# Patient Record
Sex: FEMALE | Race: WHITE | Employment: UNEMPLOYED | URBAN - NONMETROPOLITAN AREA
[De-identification: names, ages, dates, MRNs, and addresses within clinical notes are randomized per-mention and may not be internally consistent; named-entity substitution may affect disease eponyms.]

---

## 2018-02-19 ENCOUNTER — OFFICE VISIT (OUTPATIENT)
Dept: UROLOGY | Age: 23
End: 2018-02-19
Payer: COMMERCIAL

## 2018-02-19 VITALS
HEART RATE: 86 BPM | HEIGHT: 61 IN | WEIGHT: 215 LBS | TEMPERATURE: 98.6 F | DIASTOLIC BLOOD PRESSURE: 75 MMHG | BODY MASS INDEX: 40.59 KG/M2 | SYSTOLIC BLOOD PRESSURE: 133 MMHG

## 2018-02-19 DIAGNOSIS — R10.30 LOWER ABDOMINAL PAIN: ICD-10-CM

## 2018-02-19 DIAGNOSIS — N32.89 BLADDER SPASMS: Primary | ICD-10-CM

## 2018-02-19 LAB
APPEARANCE FLUID: CLEAR
BILIRUBIN, POC: 0
BLOOD URINE, POC: NORMAL
CLARITY, POC: CLEAR
COLOR, POC: YELLOW
GLUCOSE URINE, POC: NORMAL
KETONES, POC: NORMAL
LEUKOCYTE EST, POC: NORMAL
NITRITE, POC: NORMAL
PH, POC: 6
PROTEIN, POC: NORMAL
SPECIFIC GRAVITY, POC: 1.03
UROBILINOGEN, POC: 0.2

## 2018-02-19 PROCEDURE — 51798 US URINE CAPACITY MEASURE: CPT | Performed by: PHYSICIAN ASSISTANT

## 2018-02-19 PROCEDURE — 81003 URINALYSIS AUTO W/O SCOPE: CPT | Performed by: PHYSICIAN ASSISTANT

## 2018-02-19 PROCEDURE — 99202 OFFICE O/P NEW SF 15 MIN: CPT | Performed by: PHYSICIAN ASSISTANT

## 2018-02-19 RX ORDER — LEVOMEFOLATE CALCIUM 15 MG
TABLET ORAL
COMMUNITY

## 2018-02-19 RX ORDER — ONDANSETRON 4 MG/1
4 TABLET, FILM COATED ORAL EVERY 8 HOURS PRN
COMMUNITY

## 2018-02-19 RX ORDER — DOXEPIN HYDROCHLORIDE 10 MG/1
10 CAPSULE ORAL NIGHTLY
COMMUNITY

## 2018-02-19 RX ORDER — VENLAFAXINE 50 MG/1
75 TABLET ORAL 3 TIMES DAILY
COMMUNITY

## 2018-02-19 ASSESSMENT — ENCOUNTER SYMPTOMS
HEARTBURN: 0
DOUBLE VISION: 0
SORE THROAT: 0
SHORTNESS OF BREATH: 0
BLURRED VISION: 0
NAUSEA: 0
WHEEZING: 0

## 2018-02-19 NOTE — PROGRESS NOTES
No cranial nerve deficit. She exhibits normal muscle tone. Coordination normal.   Skin: She is not diaphoretic. No pallor. DATA:  U/A:    Lab Results   Component Value Date    NITRITE neg 02/19/2018    COLORU yellow 02/19/2018    PROTEINU neg 02/19/2018    PHUR 6.0 02/19/2018    CLARITYU clear 02/19/2018    SPECGRAV 1.030 02/19/2018    LEUKOCYTESUR neg 02/19/2018    BILIRUBINUR 0 02/19/2018    BLOODU neg 02/19/2018    GLUCOSEU neg 02/19/2018                 1. Bladder spasms  Patient was several months of pain in her lower abdomen or what she describes as bladder spasms after she is done voiding. She has no other irritative voiding symptoms she has been treated for urinary tract infections with the same symptoms however symptoms have persisted with or without antibiotics. I do not see a recent or any urine cultures scanned in and our records. Her urine today was clear. - POCT Urinalysis no Micro  - [place] MEASUREMENT,POST-VOID RESIDUAL VOLUME BY US,NON-IMAGING    2. Lower abdominal pain  Due to the lower abdominal discomfort and voiding symptoms will have patient get a CT scan without contrast to better evaluate.  - CT ABDOMEN PELVIS WO CONTRAST Additional Contrast? None; Future      Orders Placed This Encounter   Procedures    CT ABDOMEN PELVIS WO CONTRAST Additional Contrast? None     Standing Status:   Future     Standing Expiration Date:   2/19/2019     Order Specific Question:   Additional Contrast?     Answer:   None     Order Specific Question:   Reason for exam:     Answer:   lower abdominal pain and recurent utis.  POCT Urinalysis no Micro    [place] MEASUREMENT,POST-VOID RESIDUAL VOLUME BY US,NON-IMAGING     60 ml bladder scan     No orders of the defined types were placed in this encounter.     Plan:  Patient will return in 1 week with CT scan of the abdomen pelvis without contrast.

## 2018-02-27 ENCOUNTER — HOSPITAL ENCOUNTER (OUTPATIENT)
Dept: CT IMAGING | Age: 23
Discharge: HOME | End: 2018-02-27
Payer: COMMERCIAL

## 2018-02-27 ENCOUNTER — OFFICE VISIT (OUTPATIENT)
Dept: UROLOGY | Age: 23
End: 2018-02-27
Payer: COMMERCIAL

## 2018-02-27 VITALS
SYSTOLIC BLOOD PRESSURE: 132 MMHG | BODY MASS INDEX: 40.4 KG/M2 | DIASTOLIC BLOOD PRESSURE: 90 MMHG | HEIGHT: 61 IN | WEIGHT: 214 LBS | HEART RATE: 104 BPM | TEMPERATURE: 97.2 F

## 2018-02-27 DIAGNOSIS — R10.30 LOWER ABDOMINAL PAIN: ICD-10-CM

## 2018-02-27 DIAGNOSIS — N32.81 URGENCY-FREQUENCY SYNDROME: ICD-10-CM

## 2018-02-27 DIAGNOSIS — N32.89 BLADDER SPASMS: Primary | ICD-10-CM

## 2018-02-27 LAB
APPEARANCE FLUID: CLEAR
BILIRUBIN, POC: NORMAL
BLOOD URINE, POC: NORMAL
CLARITY, POC: NORMAL
COLOR, POC: NORMAL
GLUCOSE URINE, POC: NORMAL
KETONES, POC: NORMAL
LEUKOCYTE EST, POC: NORMAL
NITRITE, POC: NORMAL
PH, POC: 5.5
PROTEIN, POC: NORMAL
SPECIFIC GRAVITY, POC: 1.03
UROBILINOGEN, POC: 0.2

## 2018-02-27 PROCEDURE — 74176 CT ABD & PELVIS W/O CONTRAST: CPT

## 2018-02-27 PROCEDURE — 99213 OFFICE O/P EST LOW 20 MIN: CPT | Performed by: PHYSICIAN ASSISTANT

## 2018-02-27 RX ORDER — HYDROXYZINE 50 MG/1
50 TABLET, FILM COATED ORAL 3 TIMES DAILY PRN
COMMUNITY

## 2018-02-27 ASSESSMENT — ENCOUNTER SYMPTOMS
EYE DISCHARGE: 0
NAUSEA: 0
WHEEZING: 0
HEARTBURN: 0
SHORTNESS OF BREATH: 0
EYE REDNESS: 0

## 2018-04-30 ENCOUNTER — HOSPITAL ENCOUNTER (OUTPATIENT)
Facility: HOSPITAL | Age: 23
Setting detail: OBSERVATION
End: 2018-05-01
Attending: FAMILY MEDICINE | Admitting: FAMILY MEDICINE

## 2018-04-30 DIAGNOSIS — T14.91XA SUICIDAL BEHAVIOR WITH ATTEMPTED SELF-INJURY (HCC): ICD-10-CM

## 2018-04-30 DIAGNOSIS — T43.012A: Primary | ICD-10-CM

## 2018-04-30 PROBLEM — F31.9 BIPOLAR 1 DISORDER (HCC): Status: ACTIVE | Noted: 2018-04-30

## 2018-04-30 LAB
ALBUMIN SERPL-MCNC: 4.4 G/DL (ref 3.4–4.8)
ALBUMIN/GLOB SERPL: 1.4 G/DL (ref 1.1–1.8)
ALP SERPL-CCNC: 119 U/L (ref 38–126)
ALT SERPL W P-5'-P-CCNC: 41 U/L (ref 9–52)
AMPHET+METHAMPHET UR QL: NEGATIVE
ANION GAP SERPL CALCULATED.3IONS-SCNC: 14 MMOL/L (ref 5–15)
APAP SERPL-MCNC: <10 MCG/ML (ref 10–30)
AST SERPL-CCNC: 28 U/L (ref 14–36)
BARBITURATES UR QL SCN: NEGATIVE
BASOPHILS # BLD AUTO: 0.01 10*3/MM3 (ref 0–0.2)
BASOPHILS NFR BLD AUTO: 0.1 % (ref 0–2)
BENZODIAZ UR QL SCN: NEGATIVE
BILIRUB SERPL-MCNC: 0.3 MG/DL (ref 0.2–1.3)
BUN BLD-MCNC: 10 MG/DL (ref 7–21)
BUN/CREAT SERPL: 12.8 (ref 7–25)
CALCIUM SPEC-SCNC: 9.3 MG/DL (ref 8.4–10.2)
CANNABINOIDS SERPL QL: POSITIVE
CHLORIDE SERPL-SCNC: 104 MMOL/L (ref 95–110)
CO2 SERPL-SCNC: 21 MMOL/L (ref 22–31)
COCAINE UR QL: NEGATIVE
CREAT BLD-MCNC: 0.78 MG/DL (ref 0.5–1)
DEPRECATED RDW RBC AUTO: 38.2 FL (ref 36.4–46.3)
EOSINOPHIL # BLD AUTO: 0.01 10*3/MM3 (ref 0–0.7)
EOSINOPHIL NFR BLD AUTO: 0.1 % (ref 0–7)
ERYTHROCYTE [DISTWIDTH] IN BLOOD BY AUTOMATED COUNT: 13 % (ref 11.5–14.5)
ETHANOL BLD-MCNC: <10 MG/DL (ref 0–10)
ETHANOL UR QL: <0.01 %
GFR SERPL CREATININE-BSD FRML MDRD: 92 ML/MIN/1.73 (ref 71–165)
GLOBULIN UR ELPH-MCNC: 3.2 GM/DL (ref 2.3–3.5)
GLUCOSE BLD-MCNC: 89 MG/DL (ref 60–100)
GLUCOSE BLDC GLUCOMTR-MCNC: 71 MG/DL (ref 70–130)
HCT VFR BLD AUTO: 40.9 % (ref 35–45)
HGB BLD-MCNC: 13.8 G/DL (ref 12–15.5)
HOLD SPECIMEN: NORMAL
IMM GRANULOCYTES # BLD: 0.02 10*3/MM3 (ref 0–0.02)
IMM GRANULOCYTES NFR BLD: 0.2 % (ref 0–0.5)
LYMPHOCYTES # BLD AUTO: 1.51 10*3/MM3 (ref 0.6–4.2)
LYMPHOCYTES NFR BLD AUTO: 17.1 % (ref 10–50)
MCH RBC QN AUTO: 27.1 PG (ref 26.5–34)
MCHC RBC AUTO-ENTMCNC: 33.7 G/DL (ref 31.4–36)
MCV RBC AUTO: 80.4 FL (ref 80–98)
METHADONE UR QL SCN: NEGATIVE
MONOCYTES # BLD AUTO: 0.35 10*3/MM3 (ref 0–0.9)
MONOCYTES NFR BLD AUTO: 4 % (ref 0–12)
NEUTROPHILS # BLD AUTO: 6.95 10*3/MM3 (ref 2–8.6)
NEUTROPHILS NFR BLD AUTO: 78.5 % (ref 37–80)
OPIATES UR QL: NEGATIVE
OXYCODONE UR QL SCN: NEGATIVE
PLATELET # BLD AUTO: 257 10*3/MM3 (ref 150–450)
PMV BLD AUTO: 10.6 FL (ref 8–12)
POTASSIUM BLD-SCNC: 3.7 MMOL/L (ref 3.5–5.1)
PROT SERPL-MCNC: 7.6 G/DL (ref 6.3–8.6)
RBC # BLD AUTO: 5.09 10*6/MM3 (ref 3.77–5.16)
SALICYLATES SERPL-MCNC: <1 MG/DL (ref 10–20)
SODIUM BLD-SCNC: 139 MMOL/L (ref 137–145)
WBC NRBC COR # BLD: 8.85 10*3/MM3 (ref 3.2–9.8)
WHOLE BLOOD HOLD SPECIMEN: NORMAL
WHOLE BLOOD HOLD SPECIMEN: NORMAL

## 2018-04-30 PROCEDURE — 93010 ELECTROCARDIOGRAM REPORT: CPT | Performed by: INTERNAL MEDICINE

## 2018-04-30 PROCEDURE — G0378 HOSPITAL OBSERVATION PER HR: HCPCS

## 2018-04-30 PROCEDURE — 80307 DRUG TEST PRSMV CHEM ANLYZR: CPT | Performed by: STUDENT IN AN ORGANIZED HEALTH CARE EDUCATION/TRAINING PROGRAM

## 2018-04-30 PROCEDURE — 82962 GLUCOSE BLOOD TEST: CPT

## 2018-04-30 PROCEDURE — 80053 COMPREHEN METABOLIC PANEL: CPT | Performed by: STUDENT IN AN ORGANIZED HEALTH CARE EDUCATION/TRAINING PROGRAM

## 2018-04-30 PROCEDURE — 84443 ASSAY THYROID STIM HORMONE: CPT | Performed by: FAMILY MEDICINE

## 2018-04-30 PROCEDURE — 85025 COMPLETE CBC W/AUTO DIFF WBC: CPT | Performed by: STUDENT IN AN ORGANIZED HEALTH CARE EDUCATION/TRAINING PROGRAM

## 2018-04-30 PROCEDURE — 84439 ASSAY OF FREE THYROXINE: CPT | Performed by: FAMILY MEDICINE

## 2018-04-30 PROCEDURE — 36415 COLL VENOUS BLD VENIPUNCTURE: CPT

## 2018-04-30 PROCEDURE — 93005 ELECTROCARDIOGRAM TRACING: CPT | Performed by: FAMILY MEDICINE

## 2018-04-30 PROCEDURE — 99284 EMERGENCY DEPT VISIT MOD MDM: CPT

## 2018-04-30 RX ORDER — SODIUM CHLORIDE 0.9 % (FLUSH) 0.9 %
1-10 SYRINGE (ML) INJECTION AS NEEDED
Status: DISCONTINUED | OUTPATIENT
Start: 2018-04-30 | End: 2018-05-01 | Stop reason: HOSPADM

## 2018-04-30 RX ORDER — DICYCLOMINE HYDROCHLORIDE 10 MG/1
10 CAPSULE ORAL EVERY 8 HOURS SCHEDULED
COMMUNITY
End: 2018-04-30

## 2018-04-30 RX ORDER — HYDROXYZINE PAMOATE 25 MG/1
50 CAPSULE ORAL 3 TIMES DAILY PRN
Status: DISCONTINUED | OUTPATIENT
Start: 2018-04-30 | End: 2018-05-01 | Stop reason: HOSPADM

## 2018-04-30 RX ORDER — HYDROXYZINE PAMOATE 50 MG/1
0.5 CAPSULE ORAL 3 TIMES DAILY PRN
COMMUNITY
End: 2018-05-07 | Stop reason: HOSPADM

## 2018-04-30 RX ORDER — SODIUM CHLORIDE 0.9 % (FLUSH) 0.9 %
10 SYRINGE (ML) INJECTION AS NEEDED
Status: DISCONTINUED | OUTPATIENT
Start: 2018-04-30 | End: 2018-05-01 | Stop reason: HOSPADM

## 2018-04-30 RX ORDER — ARIPIPRAZOLE 10 MG/1
10 TABLET ORAL DAILY
COMMUNITY
End: 2018-04-30

## 2018-04-30 RX ORDER — OXCARBAZEPINE 150 MG/1
150 TABLET, FILM COATED ORAL 2 TIMES DAILY
Status: DISCONTINUED | OUTPATIENT
Start: 2018-04-30 | End: 2018-05-01 | Stop reason: HOSPADM

## 2018-04-30 RX ORDER — ACETAMINOPHEN 325 MG/1
650 TABLET ORAL EVERY 4 HOURS PRN
Status: DISCONTINUED | OUTPATIENT
Start: 2018-04-30 | End: 2018-05-01 | Stop reason: HOSPADM

## 2018-04-30 RX ORDER — VENLAFAXINE HYDROCHLORIDE 75 MG/1
75 CAPSULE, EXTENDED RELEASE ORAL DAILY
Status: DISCONTINUED | OUTPATIENT
Start: 2018-05-01 | End: 2018-04-30

## 2018-04-30 RX ORDER — VENLAFAXINE HYDROCHLORIDE 75 MG/1
75 CAPSULE, EXTENDED RELEASE ORAL DAILY
COMMUNITY
End: 2018-05-07 | Stop reason: HOSPADM

## 2018-04-30 RX ORDER — FLUOXETINE HYDROCHLORIDE 40 MG/1
40 CAPSULE ORAL DAILY
COMMUNITY
End: 2018-04-30

## 2018-04-30 RX ORDER — OXCARBAZEPINE 150 MG/1
150 TABLET, FILM COATED ORAL 2 TIMES DAILY
COMMUNITY
End: 2018-05-07 | Stop reason: HOSPADM

## 2018-04-30 RX ORDER — RANITIDINE 150 MG/1
150 CAPSULE ORAL EVERY 12 HOURS SCHEDULED
COMMUNITY
End: 2021-04-13

## 2018-04-30 RX ORDER — DOCUSATE SODIUM 100 MG/1
100 CAPSULE, LIQUID FILLED ORAL 2 TIMES DAILY PRN
Status: DISCONTINUED | OUTPATIENT
Start: 2018-04-30 | End: 2018-05-01 | Stop reason: HOSPADM

## 2018-04-30 RX ORDER — LEVOMEFOLATE CALCIUM 15 MG
1 TABLET ORAL DAILY
COMMUNITY
End: 2021-04-13

## 2018-04-30 RX ORDER — VENLAFAXINE HYDROCHLORIDE 75 MG/1
75 CAPSULE, EXTENDED RELEASE ORAL
Status: DISCONTINUED | OUTPATIENT
Start: 2018-05-01 | End: 2018-05-01 | Stop reason: HOSPADM

## 2018-04-30 RX ORDER — VENLAFAXINE HYDROCHLORIDE 37.5 MG/1
37.5 CAPSULE, EXTENDED RELEASE ORAL
Status: DISCONTINUED | OUTPATIENT
Start: 2018-05-01 | End: 2018-05-01 | Stop reason: HOSPADM

## 2018-04-30 RX ORDER — DOXEPIN HYDROCHLORIDE 10 MG/1
10 CAPSULE ORAL NIGHTLY
COMMUNITY
End: 2018-05-07 | Stop reason: HOSPADM

## 2018-04-30 RX ADMIN — ACTIVATED CHARCOAL 50 G: 208 SUSPENSION ORAL at 18:45

## 2018-05-01 ENCOUNTER — HOSPITAL ENCOUNTER (INPATIENT)
Facility: HOSPITAL | Age: 23
LOS: 6 days | Discharge: HOME | End: 2018-05-07
Attending: PSYCHIATRY & NEUROLOGY | Admitting: PSYCHIATRY & NEUROLOGY

## 2018-05-01 VITALS
BODY MASS INDEX: 34.56 KG/M2 | RESPIRATION RATE: 18 BRPM | TEMPERATURE: 100 F | HEIGHT: 64 IN | HEART RATE: 81 BPM | WEIGHT: 202.4 LBS | DIASTOLIC BLOOD PRESSURE: 73 MMHG | SYSTOLIC BLOOD PRESSURE: 131 MMHG | OXYGEN SATURATION: 98 %

## 2018-05-01 PROBLEM — R45.851 SUICIDAL IDEATION: Status: ACTIVE | Noted: 2018-05-01

## 2018-05-01 PROBLEM — T50.902A SUICIDE ATTEMPT BY DRUG INGESTION (HCC): Status: ACTIVE | Noted: 2018-05-01

## 2018-05-01 LAB
ANION GAP SERPL CALCULATED.3IONS-SCNC: 10 MMOL/L (ref 5–15)
BASOPHILS # BLD AUTO: 0.01 10*3/MM3 (ref 0–0.2)
BASOPHILS NFR BLD AUTO: 0.2 % (ref 0–2)
BUN BLD-MCNC: 10 MG/DL (ref 7–21)
BUN/CREAT SERPL: 13.7 (ref 7–25)
CALCIUM SPEC-SCNC: 9.5 MG/DL (ref 8.4–10.2)
CHLORIDE SERPL-SCNC: 108 MMOL/L (ref 95–110)
CO2 SERPL-SCNC: 25 MMOL/L (ref 22–31)
CREAT BLD-MCNC: 0.73 MG/DL (ref 0.5–1)
DEPRECATED RDW RBC AUTO: 39.7 FL (ref 36.4–46.3)
EOSINOPHIL # BLD AUTO: 0.04 10*3/MM3 (ref 0–0.7)
EOSINOPHIL NFR BLD AUTO: 0.6 % (ref 0–7)
ERYTHROCYTE [DISTWIDTH] IN BLOOD BY AUTOMATED COUNT: 13.3 % (ref 11.5–14.5)
GFR SERPL CREATININE-BSD FRML MDRD: 100 ML/MIN/1.73 (ref 60–165)
GLUCOSE BLD-MCNC: 85 MG/DL (ref 60–100)
HCT VFR BLD AUTO: 39.3 % (ref 35–45)
HGB BLD-MCNC: 13 G/DL (ref 12–15.5)
IMM GRANULOCYTES # BLD: 0.01 10*3/MM3 (ref 0–0.02)
IMM GRANULOCYTES NFR BLD: 0.2 % (ref 0–0.5)
LYMPHOCYTES # BLD AUTO: 2.29 10*3/MM3 (ref 0.6–4.2)
LYMPHOCYTES NFR BLD AUTO: 36.5 % (ref 10–50)
MCH RBC QN AUTO: 27 PG (ref 26.5–34)
MCHC RBC AUTO-ENTMCNC: 33.1 G/DL (ref 31.4–36)
MCV RBC AUTO: 81.5 FL (ref 80–98)
MONOCYTES # BLD AUTO: 0.49 10*3/MM3 (ref 0–0.9)
MONOCYTES NFR BLD AUTO: 7.8 % (ref 0–12)
NEUTROPHILS # BLD AUTO: 3.44 10*3/MM3 (ref 2–8.6)
NEUTROPHILS NFR BLD AUTO: 54.7 % (ref 37–80)
PLATELET # BLD AUTO: 246 10*3/MM3 (ref 150–450)
PMV BLD AUTO: 10.8 FL (ref 8–12)
POTASSIUM BLD-SCNC: 3.8 MMOL/L (ref 3.5–5.1)
RBC # BLD AUTO: 4.82 10*6/MM3 (ref 3.77–5.16)
SODIUM BLD-SCNC: 143 MMOL/L (ref 137–145)
WBC NRBC COR # BLD: 6.28 10*3/MM3 (ref 3.2–9.8)

## 2018-05-01 PROCEDURE — G0378 HOSPITAL OBSERVATION PER HR: HCPCS

## 2018-05-01 PROCEDURE — 80048 BASIC METABOLIC PNL TOTAL CA: CPT | Performed by: FAMILY MEDICINE

## 2018-05-01 PROCEDURE — G0480 DRUG TEST DEF 1-7 CLASSES: HCPCS | Performed by: STUDENT IN AN ORGANIZED HEALTH CARE EDUCATION/TRAINING PROGRAM

## 2018-05-01 PROCEDURE — 85025 COMPLETE CBC W/AUTO DIFF WBC: CPT | Performed by: FAMILY MEDICINE

## 2018-05-01 PROCEDURE — 99243 OFF/OP CNSLTJ NEW/EST LOW 30: CPT | Performed by: PSYCHIATRY & NEUROLOGY

## 2018-05-01 PROCEDURE — 99218 PR INITIAL OBSERVATION CARE/DAY 30 MINUTES: CPT | Performed by: FAMILY MEDICINE

## 2018-05-01 RX ORDER — TRAZODONE HYDROCHLORIDE 50 MG/1
50 TABLET ORAL NIGHTLY PRN
Status: DISCONTINUED | OUTPATIENT
Start: 2018-05-01 | End: 2018-05-02

## 2018-05-01 RX ORDER — LOPERAMIDE HYDROCHLORIDE 2 MG/1
2 CAPSULE ORAL 4 TIMES DAILY PRN
Status: DISCONTINUED | OUTPATIENT
Start: 2018-05-01 | End: 2018-05-07 | Stop reason: HOSPADM

## 2018-05-01 RX ORDER — SODIUM CHLORIDE 9 MG/ML
100 INJECTION, SOLUTION INTRAVENOUS CONTINUOUS
Status: DISCONTINUED | OUTPATIENT
Start: 2018-05-01 | End: 2018-05-01 | Stop reason: HOSPADM

## 2018-05-01 RX ORDER — ALUMINA, MAGNESIA, AND SIMETHICONE 2400; 2400; 240 MG/30ML; MG/30ML; MG/30ML
15 SUSPENSION ORAL EVERY 6 HOURS PRN
Status: DISCONTINUED | OUTPATIENT
Start: 2018-05-01 | End: 2018-05-07 | Stop reason: HOSPADM

## 2018-05-01 RX ORDER — HYDROXYZINE PAMOATE 50 MG/1
50 CAPSULE ORAL EVERY 6 HOURS PRN
Status: DISCONTINUED | OUTPATIENT
Start: 2018-05-01 | End: 2018-05-04

## 2018-05-01 RX ORDER — ACETAMINOPHEN 325 MG/1
650 TABLET ORAL EVERY 4 HOURS PRN
Status: DISCONTINUED | OUTPATIENT
Start: 2018-05-01 | End: 2018-05-07 | Stop reason: HOSPADM

## 2018-05-01 RX ADMIN — SODIUM CHLORIDE 100 ML/HR: 9 INJECTION, SOLUTION INTRAVENOUS at 05:24

## 2018-05-01 RX ADMIN — VENLAFAXINE HYDROCHLORIDE 37.5 MG: 37.5 CAPSULE, EXTENDED RELEASE ORAL at 09:03

## 2018-05-01 RX ADMIN — OXCARBAZEPINE 150 MG: 150 TABLET, FILM COATED ORAL at 09:03

## 2018-05-01 RX ADMIN — VENLAFAXINE HYDROCHLORIDE 75 MG: 37.5 CAPSULE, EXTENDED RELEASE ORAL at 09:02

## 2018-05-01 RX ADMIN — OXCARBAZEPINE 150 MG: 150 TABLET, FILM COATED ORAL at 00:42

## 2018-05-01 NOTE — H&P
HISTORY AND PHYSICAL  NAME: Magdalena Marquis  : 1995  MRN: [id number]    DATE OF ADMISSION: 18    DATE & TIME SEEN: 18 7:57 PM    PCP: No Known Provider    CODE STATUS: Full code    CHIEF COMPLAINT Suicide attempt    HPI:  Magdalena Marquis is a 23 y.o. female with a CMH of Bipolar disorder and ADHD who presents to the E.D. After ingesting 5 Doxepin 10 mg tablets 20 minutes prior to arrival in a suicide attempt. This was at approximately 1800. The patient reports she has been having worsening depression since the death of her mother in 2016. She has been been fighting with her boyfriend with whom she lives. He broke up with her today, and she decided to take the Doxepin in an attempt to kill herself. She has been having suicidal thoughts for several months. She still has thoughts of harming herself. She has thought of taking a lot of her pills to kill herself. She reports she has no support system at home and believes she is now homeless, as her now former boyfriend will not let her stay there. She does have a grandmother in [place], [place]. She does feel very tired at this time.     CONCURRENT MEDICAL HISTORY:  Past Medical History:   Diagnosis Date   • Anxiety    • Depression    • Suicidal ideation        PAST SURGICAL HISTORY:  History reviewed. No pertinent surgical history.    FAMILY HISTORY:  History reviewed. No pertinent family history.     SOCIAL HISTORY:  Social History     Social History   • Marital status: Single     Spouse name: N/A   • Number of children: N/A   • Years of education: N/A     Occupational History   • Not on file.     Social History Main Topics   • Smoking status: Not on file   • Smokeless tobacco: Not on file   • Alcohol use No   • Drug use: Unknown   • Sexual activity: Not on file     Other Topics Concern   • Not on file     Social History Narrative   • No narrative on file       HOME MEDICATIONS:    Current Facility-Administered Medications:   •  sodium  chloride 0.9 % flush 10 mL, 10 mL, Intravenous, PRN, Ketty Orozco MD    Current Outpatient Prescriptions:   •  doxepin (SINEquan) 10 MG capsule, Take 10 mg by mouth Every Night., Disp: , Rfl:   •  Etonogestrel (NEXPLANON SC), Nexplanon, Disp: , Rfl:   •  hydrOXYzine (VISTARIL) 50 MG capsule, Take 0.5 tablets by mouth 3 (Three) Times a Day As Needed. Take 0.5-1 tablets by mouth three times daily as needed for anxiety., Disp: , Rfl:   •  l-methylfolate 15 MG tablet tablet, Take 1 tablet by mouth Daily., Disp: , Rfl:   •  lisdexamfetamine (VYVANSE) 30 MG capsule, Take 30 mg by mouth Daily, Disp: , Rfl:   •  OXcarbazepine (TRILEPTAL) 150 MG tablet, Take 150 mg by mouth 2 (Two) Times a Day., Disp: , Rfl:   •  ranitidine (ZANTAC) 150 MG capsule, Take 150 mg by mouth Every 12 (Twelve) Hours., Disp: , Rfl:   •  venlafaxine XR (EFFEXOR XR) 75 MG 24 hr capsule, Take 75 mg by mouth Daily., Disp: , Rfl:     ALLERGIES:  Levaquin [levofloxacin] and Sulfa antibiotics    REVIEW OF SYSTEMS  Review of Systems   Constitutional: Positive for fatigue. Negative for appetite change, chills and fever.   HENT: Negative for ear pain, hearing loss, rhinorrhea, sore throat and trouble swallowing.    Eyes: Positive for visual disturbance (Blurry vision since taking the pills. ). Negative for pain.   Respiratory: Negative for cough and shortness of breath.    Cardiovascular: Negative for chest pain, palpitations and leg swelling.   Gastrointestinal: Negative for abdominal pain, constipation, diarrhea, nausea and vomiting.   Genitourinary: Negative for dysuria and hematuria.   Musculoskeletal: Negative for back pain and neck pain.   Skin: Negative for rash and wound.   Neurological: Negative for dizziness, syncope and headaches.   Psychiatric/Behavioral: Positive for dysphoric mood, sleep disturbance and suicidal ideas. The patient is nervous/anxious.        PHYSICAL EXAM:  Temp:  [99.3 °F (37.4 °C)] 99.3 °F (37.4 °C)  Heart Rate:  []  91  Resp:  [18] 18  BP: (105-131)/(58-89) 105/65  Body mass index is 39.49 kg/m².     Physical Exam   Constitutional: She is oriented to person, place, and time. She appears well-developed and well-nourished.   HENT:   Head: Normocephalic and atraumatic.   Mouth/Throat: Oropharynx is clear and moist. No oropharyngeal exudate.   Eyes: Conjunctivae and EOM are normal. Pupils are equal, round, and reactive to light.   Neck: Normal range of motion. Neck supple. No tracheal deviation present. No thyromegaly present.   Cardiovascular: Normal rate, regular rhythm and normal heart sounds.  Exam reveals no gallop and no friction rub.    No murmur heard.  Pulmonary/Chest: Effort normal and breath sounds normal. No respiratory distress. She has no wheezes. She has no rales.   Abdominal: Soft. Bowel sounds are normal. She exhibits no distension. There is no tenderness.   Musculoskeletal: Normal range of motion. She exhibits no edema or deformity.   Lymphadenopathy:     She has no cervical adenopathy.   Neurological: She is alert and oriented to person, place, and time.   Skin: Skin is warm and dry. No rash noted. No erythema.   Psychiatric: She has a normal mood and affect. Her behavior is normal. Thought content normal.   Vitals reviewed.      DIAGNOSTIC DATA:   Lab Results (last 24 hours)     Procedure Component Value Units Date/Time    [place] Draw [[id number]] Collected:  04/30/18 1857    Specimen:  Blood Updated:  04/30/18 2000    Narrative:       The following orders were created for panel order [place] Draw.  Procedure                               Abnormality         Status                     ---------                               -----------         ------                     Light Blue Top[[id number]]                                   Final result               Green Top (Gel)[[id number]]                                  Final result               Lavender Top[[id number]]                                     Final result                Gold Top - [facility][[id number]]                                                                Please view results for these tests on the individual orders.    Light Blue Top [[id number]] Collected:  04/30/18 1857    Specimen:  Blood Updated:  04/30/18 2000     Extra Tube hold for add-on     Comment: Auto resulted       Green Top (Gel) [[id number]] Collected:  04/30/18 1857    Specimen:  Blood Updated:  04/30/18 2000     Extra Tube Hold for add-ons.     Comment: Auto resulted.       Lavender Top [[id number]] Collected:  04/30/18 1857    Specimen:  Blood Updated:  04/30/18 2000     Extra Tube hold for add-on     Comment: Auto resulted       Urine Drug Screen - Urine, Clean Catch [[id number]]  (Abnormal) Collected:  04/30/18 1858    Specimen:  Urine from Urine, Clean Catch Updated:  04/30/18 1931     Amphetamine Screen, Urine Negative     Barbiturates Screen, Urine Negative     Benzodiazepine Screen, Urine Negative     Cocaine Screen, Urine Negative     Methadone Screen, Urine Negative     Opiate Screen Negative     Oxycodone Screen, Urine Negative     THC, Screen, Urine Positive (A)    Narrative:       Negative Thresholds For Drugs Screened in Urine:     Amphetamines          500 ng/ml  Barbiturates          200 ng/ml  Benzodiazepines       200 ng/ml  Cocaine               150 ng/ml  Methadone             300 ng/mL  Opiates               300 ng/mL  Oxycodone             100 ng/mL  THC                   20 ng/mL    The normal value for all drugs tested is negative. This report includes final unconfirmed screening results.  A positive result by this assay can be, at your request, sent to the Reference Lab for confirmation by gas chromatography. Unconfirmed results must not be used for non-medical purposes, such as employment or legal testing. Clinical consideration should be applied to any drug of abuse test result, particularly when unconfirmed results are used.    POC Glucose Once [[id number]]  (Normal) Collected:   04/30/18 1904    Specimen:  Blood Updated:  04/30/18 1918     Glucose 71 mg/dL      Comment: Meter: [id number]: [id number] MATTHEW CHACON       Acetaminophen Level [[id number]]  (Abnormal) Collected:  04/30/18 1857    Specimen:  Blood Updated:  04/30/18 1915     Acetaminophen <10.0 (L) mcg/mL     Ethanol [[id number]] Collected:  04/30/18 1857    Specimen:  Blood Updated:  04/30/18 1915     Ethanol <10 mg/dL      Ethanol % <0.010 %     Comprehensive Metabolic Panel [[id number]]  (Abnormal) Collected:  04/30/18 1857    Specimen:  Blood Updated:  04/30/18 1915     Glucose 89 mg/dL      BUN 10 mg/dL      Creatinine 0.78 mg/dL      Sodium 139 mmol/L      Potassium 3.7 mmol/L      Chloride 104 mmol/L      CO2 21.0 (L) mmol/L      Calcium 9.3 mg/dL      Total Protein 7.6 g/dL      Albumin 4.40 g/dL      ALT (SGPT) 41 U/L      AST (SGOT) 28 U/L      Alkaline Phosphatase 119 U/L      Total Bilirubin 0.3 mg/dL      eGFR Non African Amer 92 mL/min/1.73      Globulin 3.2 gm/dL      A/G Ratio 1.4 g/dL      BUN/Creatinine Ratio 12.8     Anion Gap 14.0 mmol/L     Salicylate Level [[id number]]  (Abnormal) Collected:  04/30/18 1857    Specimen:  Blood Updated:  04/30/18 1915     Salicylate <1.0 (L) mg/dL     CBC & Differential [[id number]] Collected:  04/30/18 1857    Specimen:  Blood Updated:  04/30/18 1901    Narrative:       The following orders were created for panel order CBC & Differential.  Procedure                               Abnormality         Status                     ---------                               -----------         ------                     CBC Auto Differential[[id number]]        Normal              Final result                 Please view results for these tests on the individual orders.    CBC Auto Differential [[id number]]  (Normal) Collected:  04/30/18 1857    Specimen:  Blood Updated:  04/30/18 1901     WBC 8.85 10*3/mm3      RBC 5.09 10*6/mm3      Hemoglobin 13.8 g/dL      Hematocrit 40.9 %       MCV 80.4 fL      MCH 27.1 pg      MCHC 33.7 g/dL      RDW 13.0 %      RDW-SD 38.2 fl      MPV 10.6 fL      Platelets 257 10*3/mm3      Neutrophil % 78.5 %      Lymphocyte % 17.1 %      Monocyte % 4.0 %      Eosinophil % 0.1 %      Basophil % 0.1 %      Immature Grans % 0.2 %      Neutrophils, Absolute 6.95 10*3/mm3      Lymphocytes, Absolute 1.51 10*3/mm3      Monocytes, Absolute 0.35 10*3/mm3      Eosinophils, Absolute 0.01 10*3/mm3      Basophils, Absolute 0.01 10*3/mm3      Immature Grans, Absolute 0.02 10*3/mm3            Imaging Results (last 24 hours)     ** No results found for the last 24 hours. **            I reviewed the patient's new clinical results.    ASSESSMENT AND PLAN: This is a 23 y.o. female with:    Active Hospital Problems (** Indicates Principal Problem)    Diagnosis Date Noted   • Intentional doxepin overdose [T43.012A] 04/30/2018     Patient ingested 5 10 mg tablets in a suicide attempt. Poison control was contacted who recommended Activated Charcoal, which the patient received. They recommended observation overnight and a repeat EKG at 2130.   - NPO until in the morning.   - Inpatient psych will be consulted in the morning after the patient is medically cleared.   - A 72 hours hold was placed on the patient.     • Bipolar 1 disorder [F31.9] 04/30/2018     I will continue the patient's home dose of Trileptal, Effexor,and  Hydroxyzine,         Resolved Hospital Problems    Diagnosis Date Noted Date Resolved   No resolved problems to display.       DVT prophylaxis: SCDs/TEDs     [facility] [phone] , reviewed and consistent with patient reported medications.    Expected Length of Stay: Where: Inpatient Psych.  and When:  tomorrow    I discussed the patients findings and my recommendations with patient.     Dr. Marcano is the attending on record at time of admission, He is aware of the patient's status and agrees with the above history and physical.          This document has been electronically  signed by Stephen Garner MD on April 30, 2018 9:33 PM

## 2018-05-01 NOTE — ED NOTES
Spoke with Selin in poison control. Patient stating she took x5 doxepin capsules. Selin stated to have Aspirin levels/tylenol levels/and drug screen run. Obtain EKG and monitor for interval changes, if changes occur patient needs to have approximately 3 amps of bicarb run in a liter of NS fluids at maintenance levels. Watch patient for approximately 6 hours post ingestion, if patient begins to become sleepy, keep patient overnight.      Astrid Rosas RN  04/30/18 2034

## 2018-05-01 NOTE — PLAN OF CARE
Problem: Suicide Risk (Adult)  Goal: Identify Related Risk Factors and Signs and Symptoms  Outcome: Outcome(s) achieved Date Met: 05/01/18    Goal: Strength-Based Wellness/Recovery  Outcome: Ongoing (interventions implemented as appropriate)    Goal: Physical Safety  Outcome: Ongoing (interventions implemented as appropriate)      Problem: Patient Care Overview  Goal: Plan of Care Review  Outcome: Ongoing (interventions implemented as appropriate)   05/01/18 0451   Coping/Psychosocial   Plan of Care Reviewed With patient   Plan of Care Review   Progress no change   OTHER   Outcome Summary multople BMs this shift, resting well throughout the night     Goal: Individualization and Mutuality  Outcome: Ongoing (interventions implemented as appropriate)    Goal: Discharge Needs Assessment  Outcome: Ongoing (interventions implemented as appropriate)

## 2018-05-01 NOTE — NURSING NOTE
Behavior   Anxiety: Feeling anxious  Depression: anxiety  Pain  0  AVH   no  S/I   no  H/I   no    Affect   calm and pleasant    Note:Patient verbalized anxiety due to coming on this floor. Patient smiles with interactions. Patient revealed she took the pills because her boyfriend was cheating and has been verbally cruel at times. Patient became depressed when her mother  in . Patient plans on moving to [place] with her aunt that helped raise her d/t mom being LP. Patient has been living with boyfriend and his mom and the mom does drugs and boyfriend drinks more than he should.    Intervention  Instructed in medication usage and effects  Medications administered as ordered  Encouraged to verbalize needs      Response  Verbalized understanding   Did patient take medications as ordered none ordered  Did patient interact with assessment?  yes     Plan  Will monitor for safety  Will monitor every 15 minutes as ordered  Will evaluate and promote the plan of care

## 2018-05-01 NOTE — PLAN OF CARE
Problem: Suicidal Behavior (Adult)  Goal: Suicidal Behavior is Absent/Minimized/Managed  Outcome: Ongoing (interventions implemented as appropriate)      Problem: Mood Impairment (Depressive Signs/Symptoms) (Adult)  Goal: Improved Mood Symptoms (Depressive Signs/Symptoms)  Outcome: Ongoing (interventions implemented as appropriate)      Problem: Decreased Participation/Engagement (Depressive Signs/Symptoms) (Adult)  Goal: Increased Participation/Engagement (Depressive Signs/Symptoms)  Outcome: Ongoing (interventions implemented as appropriate)      Problem: Overarching Goals (Adult)  Goal: Adheres to Safety Considerations for Self and Others  Outcome: Ongoing (interventions implemented as appropriate)    Goal: Optimized Coping Skills in Response to Life Stressors  Outcome: Ongoing (interventions implemented as appropriate)    Goal: Develops/Participates in Therapeutic [place] to Support Successful Transition  Outcome: Ongoing (interventions implemented as appropriate)      Problem: Patient Care Overview  Goal: Plan of Care Review  Outcome: Ongoing (interventions implemented as appropriate)    Goal: Individualization and Mutuality  Outcome: Ongoing (interventions implemented as appropriate)    Goal: Discharge Needs Assessment  Outcome: Ongoing (interventions implemented as appropriate)    Goal: Interprofessional Rounds/Family Conf  Outcome: Ongoing (interventions implemented as appropriate)

## 2018-05-01 NOTE — PROGRESS NOTES
FAMILY MEDICINE DAILY PROGRESS NOTE    NAME: Magdalena Marquis  : 1995  MRN: [id number]      LOS: 0 days     PROVIDER OF SERVICE: Marcus Webster MD    Chief Complaint: Drug overdose     Subjective:     Interval History:  History taken from: patient chart  Patient Complaints: feeling down  Patient Denies:  Chest pain, palpitations, n/v, fever, diarrhea, visual disturbances  Patient states she is still feeling down but has no suicidal ideation at this time. Reports seeing Dr. Vazquez, psychiatrist at [facility]. Was informed she will be evaluated by Dr. Barr today.   Review of Systems:   Review of Systems   Constitutional: Negative for appetite change, chills, diaphoresis, fatigue and fever.   HENT: Negative for facial swelling, tinnitus and trouble swallowing.    Eyes: Negative for visual disturbance.   Respiratory: Negative for shortness of breath.    Cardiovascular: Negative for chest pain and palpitations.   Gastrointestinal: Negative for abdominal pain, blood in stool, constipation, diarrhea, nausea and vomiting.   Genitourinary: Negative for difficulty urinating, flank pain and hematuria.   Musculoskeletal: Negative for neck pain.   Skin: Negative for pallor.   Neurological: Negative for dizziness, tremors, weakness, light-headedness and headaches.   Psychiatric/Behavioral: Positive for agitation and sleep disturbance. Negative for hallucinations and suicidal ideas. The patient is nervous/anxious. The patient is not hyperactive.        Objective:     Vital Signs  Temp:  [96.4 °F (35.8 °C)-99.3 °F (37.4 °C)] 97.8 °F (36.6 °C)  Heart Rate:  [] 61  Resp:  [18] 18  BP: (105-131)/(58-89) 129/59  Body mass index is 34.74 kg/m².    Physical Exam  Physical Exam  Constitutional: She is oriented to person, place, and time. She appears well-developed and well-nourished.   HENT:   Head: Normocephalic and atraumatic.   Mouth/Throat: Oropharynx is clear and moist. No oropharyngeal exudate.   Eyes:  Conjunctivae and EOM are normal. Pupils are equal, round, and reactive to light.   Neck: Normal range of motion. Neck supple. No tracheal deviation present. No thyromegaly present.   Cardiovascular: Normal rate, regular rhythm and normal heart sounds.  Exam reveals no gallop and no friction rub.    No murmur heard.  Pulmonary/Chest: Effort normal and breath sounds normal. No respiratory distress. She has no wheezes. She has no rales.   Abdominal: Soft. Bowel sounds are normal. She exhibits no distension. There is no tenderness.   Musculoskeletal: Normal range of motion. She exhibits no edema or deformity.   Lymphadenopathy:     She has no cervical adenopathy.   Neurological: She is alert and oriented to person, place, and time.   Skin: Skin is warm and dry. No rash noted. No erythema.   Psychiatric: She has a normal mood and affect. Her behavior is normal. Thought content normal. Suicidal ideation on admission (no longer)    Medication Review    Current Facility-Administered Medications:   •  acetaminophen (TYLENOL) tablet 650 mg, 650 mg, Oral, Q4H PRN, Stephen Garner MD  •  docusate sodium (COLACE) capsule 100 mg, 100 mg, Oral, BID PRN, Stephen Garner MD  •  hydrOXYzine (VISTARIL) capsule 50 mg, 50 mg, Oral, TID PRN, Stephen Garner MD  •  OXcarbazepine (TRILEPTAL) tablet 150 mg, 150 mg, Oral, BID, Stephen Garner MD, 150 mg at 05/01/18 0042  •  sodium chloride 0.9 % flush 1-10 mL, 1-10 mL, Intravenous, PRN, Stephen Garner MD  •  sodium chloride 0.9 % flush 10 mL, 10 mL, Intravenous, PRN, Ketty Orozco MD  •  sodium chloride 0.9 % infusion, 100 mL/hr, Intravenous, Continuous, Stephen Garner MD, Last Rate: 100 mL/hr at 05/01/18 0524, 100 mL/hr at 05/01/18 0524  •  venlafaxine XR (EFFEXOR-XR) 24 hr capsule 37.5 mg, 37.5 mg, Oral, Daily With Breakfast, Stephen Garner MD  •  venlafaxine XR (EFFEXOR-XR) 24 hr capsule 75 mg, 75 mg, Oral, Daily With Breakfast, Stephen Jaime  MD Patsy     Diagnostic Data    Lab Results (last 24 hours)     Procedure Component Value Units Date/Time    Basic Metabolic Panel [[id number]]  (Normal) Collected:  05/01/18 0612    Specimen:  Blood Updated:  05/01/18 0645     Glucose 85 mg/dL      BUN 10 mg/dL      Creatinine 0.73 mg/dL      Sodium 143 mmol/L      Potassium 3.8 mmol/L      Chloride 108 mmol/L      CO2 25.0 mmol/L      Calcium 9.5 mg/dL      eGFR Non African Amer 100 mL/min/1.73      BUN/Creatinine Ratio 13.7     Anion Gap 10.0 mmol/L     CBC & Differential [[id number]] Collected:  05/01/18 0612    Specimen:  Blood Updated:  05/01/18 0634    Narrative:       The following orders were created for panel order CBC & Differential.  Procedure                               Abnormality         Status                     ---------                               -----------         ------                     CBC Auto Differential[[id number]]        Normal              Final result                 Please view results for these tests on the individual orders.    CBC Auto Differential [[id number]]  (Normal) Collected:  05/01/18 0612    Specimen:  Blood Updated:  05/01/18 0634     WBC 6.28 10*3/mm3      RBC 4.82 10*6/mm3      Hemoglobin 13.0 g/dL      Hematocrit 39.3 %      MCV 81.5 fL      MCH 27.0 pg      MCHC 33.1 g/dL      RDW 13.3 %      RDW-SD 39.7 fl      MPV 10.8 fL      Platelets 246 10*3/mm3      Neutrophil % 54.7 %      Lymphocyte % 36.5 %      Monocyte % 7.8 %      Eosinophil % 0.6 %      Basophil % 0.2 %      Immature Grans % 0.2 %      Neutrophils, Absolute 3.44 10*3/mm3      Lymphocytes, Absolute 2.29 10*3/mm3      Monocytes, Absolute 0.49 10*3/mm3      Eosinophils, Absolute 0.04 10*3/mm3      Basophils, Absolute 0.01 10*3/mm3      Immature Grans, Absolute 0.01 10*3/mm3     [place] Draw [[id number]] Collected:  04/30/18 1857    Specimen:  Blood Updated:  04/30/18 2000    Narrative:       The following orders were created for panel order [place]  Draw.  Procedure                               Abnormality         Status                     ---------                               -----------         ------                     Light Blue Top[[id number]]                                   Final result               Green Top (Gel)[[id number]]                                  Final result               Lavender Top[[id number]]                                     Final result               Gold Top - SST[[id number]]                                                                Please view results for these tests on the individual orders.    Light Blue Top [[id number]] Collected:  04/30/18 1857    Specimen:  Blood Updated:  04/30/18 2000     Extra Tube hold for add-on     Comment: Auto resulted       Green Top (Gel) [[id number]] Collected:  04/30/18 1857    Specimen:  Blood Updated:  04/30/18 2000     Extra Tube Hold for add-ons.     Comment: Auto resulted.       Lavender Top [[id number]] Collected:  04/30/18 1857    Specimen:  Blood Updated:  04/30/18 2000     Extra Tube hold for add-on     Comment: Auto resulted       Urine Drug Screen - Urine, Clean Catch [[id number]]  (Abnormal) Collected:  04/30/18 1858    Specimen:  Urine from Urine, Clean Catch Updated:  04/30/18 1931     Amphetamine Screen, Urine Negative     Barbiturates Screen, Urine Negative     Benzodiazepine Screen, Urine Negative     Cocaine Screen, Urine Negative     Methadone Screen, Urine Negative     Opiate Screen Negative     Oxycodone Screen, Urine Negative     THC, Screen, Urine Positive (A)    Narrative:       Negative Thresholds For Drugs Screened in Urine:     Amphetamines          500 ng/ml  Barbiturates          200 ng/ml  Benzodiazepines       200 ng/ml  Cocaine               150 ng/ml  Methadone             300 ng/mL  Opiates               300 ng/mL  Oxycodone             100 ng/mL  THC                   20 ng/mL    The normal value for all drugs tested is negative. This report includes final  unconfirmed screening results.  A positive result by this assay can be, at your request, sent to the Reference Lab for confirmation by gas chromatography. Unconfirmed results must not be used for non-medical purposes, such as employment or legal testing. Clinical consideration should be applied to any drug of abuse test result, particularly when unconfirmed results are used.    POC Glucose Once [[id number]]  (Normal) Collected:  04/30/18 1904    Specimen:  Blood Updated:  04/30/18 1918     Glucose 71 mg/dL      Comment: Meter: [id number]: [id number] MATTHEW CHACON       Acetaminophen Level [[id number]]  (Abnormal) Collected:  04/30/18 1857    Specimen:  Blood Updated:  04/30/18 1915     Acetaminophen <10.0 (L) mcg/mL     Ethanol [[id number]] Collected:  04/30/18 1857    Specimen:  Blood Updated:  04/30/18 1915     Ethanol <10 mg/dL      Ethanol % <0.010 %     Comprehensive Metabolic Panel [[id number]]  (Abnormal) Collected:  04/30/18 1857    Specimen:  Blood Updated:  04/30/18 1915     Glucose 89 mg/dL      BUN 10 mg/dL      Creatinine 0.78 mg/dL      Sodium 139 mmol/L      Potassium 3.7 mmol/L      Chloride 104 mmol/L      CO2 21.0 (L) mmol/L      Calcium 9.3 mg/dL      Total Protein 7.6 g/dL      Albumin 4.40 g/dL      ALT (SGPT) 41 U/L      AST (SGOT) 28 U/L      Alkaline Phosphatase 119 U/L      Total Bilirubin 0.3 mg/dL      eGFR Non African Amer 92 mL/min/1.73      Globulin 3.2 gm/dL      A/G Ratio 1.4 g/dL      BUN/Creatinine Ratio 12.8     Anion Gap 14.0 mmol/L     Salicylate Level [[id number]]  (Abnormal) Collected:  04/30/18 1857    Specimen:  Blood Updated:  04/30/18 1915     Salicylate <1.0 (L) mg/dL     CBC & Differential [[id number]] Collected:  04/30/18 1857    Specimen:  Blood Updated:  04/30/18 1901    Narrative:       The following orders were created for panel order CBC & Differential.  Procedure                               Abnormality         Status                     ---------                                -----------         ------                     CBC Auto Differential[[id number]]        Normal              Final result                 Please view results for these tests on the individual orders.    CBC Auto Differential [[id number]]  (Normal) Collected:  04/30/18 1857    Specimen:  Blood Updated:  04/30/18 1901     WBC 8.85 10*3/mm3      RBC 5.09 10*6/mm3      Hemoglobin 13.8 g/dL      Hematocrit 40.9 %      MCV 80.4 fL      MCH 27.1 pg      MCHC 33.7 g/dL      RDW 13.0 %      RDW-SD 38.2 fl      MPV 10.6 fL      Platelets 257 10*3/mm3      Neutrophil % 78.5 %      Lymphocyte % 17.1 %      Monocyte % 4.0 %      Eosinophil % 0.1 %      Basophil % 0.1 %      Immature Grans % 0.2 %      Neutrophils, Absolute 6.95 10*3/mm3      Lymphocytes, Absolute 1.51 10*3/mm3      Monocytes, Absolute 0.35 10*3/mm3      Eosinophils, Absolute 0.01 10*3/mm3      Basophils, Absolute 0.01 10*3/mm3      Immature Grans, Absolute 0.02 10*3/mm3             I reviewed the patient's new clinical results.    Assessment/Plan:     Active Hospital Problems (** Indicates Principal Problem)    Diagnosis Date Noted   • Suicidal ideation [R45.851] 05/01/2018     Present on admission. No longer has suicidal thoughts.   1:1 sitter present  Dr. Barr on board and will see patient this AM     • Intentional doxepin overdose [T43.012A] 04/30/2018     Patient ingested 5 10 mg tablets in a suicide attempt. Poison control was contacted who recommended Activated Charcoal, which the patient received. They recommended observation overnight and a repeat EKG at 2130 which was also normal  - Clear liquid diet and will advance as tolerated  - Dr. Barr was consulted and will see patient this AM  - A 72 hours hold was placed on the patient.     • Bipolar 1 disorder [F31.9] 04/30/2018     Continue home dose of Trileptal, Effexor, and  Hydroxyzine        Resolved Hospital Problems    Diagnosis Date Noted Date Resolved   No resolved problems to  display.       DVT prophylaxis: SCD/AMISH  Code status is Full Code    Plan for disposition:Where: [facility] and When:  today      Time: <30 minutes          This document has been electronically signed by Marcus Webster MD on May 1, 2018 7:44 AM

## 2018-05-01 NOTE — NURSING NOTE
Patient arrived via w/c from medical floor to be admitted to room 659 at 1642 under the care of Dr. Barr for dx. Of suicidal attempt.  Patient was escorted by security and staff to exam room for initial admission process.  Explained unit routine to patient.  Monitor as ordered to maintain patient safety.

## 2018-05-01 NOTE — NURSING NOTE
Dr Johnson ROS         General  Recent weight change    Eyes   glasses/contact lens    ENT/Mouth   None    Cardio   None    Resp   None    GI    None       None    MS    None    Skin/Hair/Nails   None    Neuro   None
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
## 2018-05-01 NOTE — CONSULTS
Adult Nutrition  Assessment    Patient Name:  Magdalena Marquis  YOB: 1995  MRN: [id number]  Admit Date:  4/30/2018    Assessment Date:  5/1/2018    Comments:  Pt admitted with admitted with intentional drug overdose with suicidal ideation.  Pt reports a wt loss of ~38# related to depression with inability to take adequate po.  Rd discussed the benefits of a healthy weight but the need for adequate po for a healthy body.  She voiced understanding.  Rd will monitor diet advancement and tolerance.            Adult Nutrition Assessment     Row Name 05/01/18 1517       Reason for Assessment    Reason For Assessment identified at risk by screening criteria    Identified At Risk by Screening Criteria unintentional loss of 10 lbs or more in the past 2 mos;MST SCORE 2+       Nutrition/Diet History    Typical Food/Fluid Intake Pt       Labs/Procedures/Meds    Lab Results Reviewed reviewed, pertinent       Physical Findings    Overall Physical Appearance overweight       Calculation Measurements    Weight Used For Calculations 91.6 kg (202 lb)       Estimated/Assessed Needs    Additional Documentation Calorie Requirements (Group);KCAL/KG (Group);Protein Requirements (Group);Tangier-St. Jeor Equation (Group);Fluid Requirements (Group)       KCAL/KG    14 Kcal/Kg (kcal) 1282.78    15 Kcal/Kg (kcal) 1374.41    18 Kcal/Kg (kcal) 1649.29    20 Kcal/Kg (kcal) 1832.54    25 Kcal/Kg (kcal) 2290.68    30 Kcal/Kg (kcal) 2748.81    35 Kcal/Kg (kcal) 3206.95    40 Kcal/Kg (kcal) 3665.08    45 Kcal/Kg (kcal) 4123.22    50 Kcal/Kg (kcal) 4581.35       Tangier-St. Jeor Equation    RMR (Tangier-St. Jeor Equation) 1661.27       Fluid Requirements    [place]-Segar Method (over 20 kg) 3332.54       PO Evaluation    Number of Days PO Intake Evaluated Insufficient Data          Electronically signed by:  Karmen De Jesus RD  05/01/18 3:21 PM

## 2018-05-02 PROBLEM — F31.4 BIPOLAR 1 DISORDER, DEPRESSED, SEVERE (HCC): Status: ACTIVE | Noted: 2018-04-30

## 2018-05-02 LAB
ARTICHOKE IGE QN: 82 MG/DL (ref 1–129)
CHOLEST SERPL-MCNC: 134 MG/DL (ref 0–199)
GLUCOSE P FAST SERPL-MCNC: 75 MG/DL (ref 60–110)
HDLC SERPL-MCNC: 27 MG/DL (ref 60–200)
LDLC/HDLC SERPL: 3.38 {RATIO} (ref 0–3.22)
T4 FREE SERPL-MCNC: 1.2 NG/DL (ref 0.78–2.19)
TRIGL SERPL-MCNC: 79 MG/DL (ref 20–199)
TSH SERPL DL<=0.05 MIU/L-ACNC: 1.28 MIU/ML (ref 0.46–4.68)

## 2018-05-02 PROCEDURE — 82947 ASSAY GLUCOSE BLOOD QUANT: CPT | Performed by: PSYCHIATRY & NEUROLOGY

## 2018-05-02 PROCEDURE — 99232 SBSQ HOSP IP/OBS MODERATE 35: CPT | Performed by: FAMILY MEDICINE

## 2018-05-02 PROCEDURE — 80061 LIPID PANEL: CPT | Performed by: PSYCHIATRY & NEUROLOGY

## 2018-05-02 PROCEDURE — 90791 PSYCH DIAGNOSTIC EVALUATION: CPT | Performed by: PSYCHIATRY & NEUROLOGY

## 2018-05-02 RX ORDER — ONDANSETRON 4 MG/1
4 TABLET, FILM COATED ORAL EVERY 6 HOURS PRN
Status: DISCONTINUED | OUTPATIENT
Start: 2018-05-02 | End: 2018-05-07 | Stop reason: HOSPADM

## 2018-05-02 RX ORDER — QUETIAPINE FUMARATE 100 MG/1
100 TABLET, FILM COATED ORAL NIGHTLY
Status: DISCONTINUED | OUTPATIENT
Start: 2018-05-02 | End: 2018-05-03

## 2018-05-02 RX ORDER — FAMOTIDINE 20 MG/1
20 TABLET, FILM COATED ORAL 2 TIMES DAILY
Status: DISCONTINUED | OUTPATIENT
Start: 2018-05-02 | End: 2018-05-07 | Stop reason: HOSPADM

## 2018-05-02 RX ADMIN — QUETIAPINE 100 MG: 100 TABLET ORAL at 20:14

## 2018-05-02 RX ADMIN — HYDROXYZINE PAMOATE 50 MG: 50 CAPSULE ORAL at 15:56

## 2018-05-02 RX ADMIN — FAMOTIDINE 20 MG: 20 TABLET ORAL at 20:14

## 2018-05-02 NOTE — PLAN OF CARE
Problem: Suicidal Behavior (Adult)  Goal: Suicidal Behavior is Absent/Minimized/Managed  Outcome: Ongoing (interventions implemented as appropriate)      Problem: Decreased Participation/Engagement (Depressive Signs/Symptoms) (Adult)  Goal: Increased Participation/Engagement (Depressive Signs/Symptoms)  Outcome: Ongoing (interventions implemented as appropriate)

## 2018-05-02 NOTE — PLAN OF CARE
Problem: Patient Care Overview  Goal: Plan of Care Review  Outcome: Ongoing (interventions implemented as appropriate)  Encourage intake of meals and supplement.   05/02/18 8429   Coping/Psychosocial   Plan of Care Reviewed With patient   Plan of Care Review   Progress no change   OTHER   Outcome Summary initial assessment
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
## 2018-05-02 NOTE — NURSING NOTE
Patient did not come out of room this evening. Patient was notified of snack but refused. Patient sleeping at this time. Will continue to monitor.

## 2018-05-02 NOTE — CONSULTS
CHIEF COMPLAINT/REASON FOR VISIT:  Suicide Attempt    HPI:  Patient presented to our ED with the above complaint on April 30 at around 6 PM.  She reported about 20 minutes prior to presentation she had taken about 6 pills of doxepin uncertain dose.  Once she had taken these she felt very upset and sought help.  She has had complicated issues with a boyfriend and multiple losses in her family and it is difficult for her to give a precise beginning day for the suicidal thoughts.  She reports that she was diagnosed as bipolar previously.    She was initially admitted to the family medicine inpatient service where they clarified that she actually took 5 doxepin tablets that were 25 mg each.  She had no medical issues on that service and was transferred to the behavioral health unit when medically cleared.    PROBLEM LIST:  Patient Active Problem List    Diagnosis   • Suicidal ideation [R45.851]     Overview Note:     Present on admission. No longer has suicidal thoughts.   1:1 sitter present  Dr. Barr on board and will see patient this AM     • Suicide attempt by drug ingestion [T50.902A]   • Intentional doxepin overdose [T43.012A]     Overview Note:     Patient ingested 5 10 mg tablets in a suicide attempt. Poison control was contacted who recommended Activated Charcoal, which the patient received. They recommended observation overnight and a repeat EKG at 2130 which was also normal  - Clear liquid diet and will advance as tolerated  - Dr. Barr was consulted and will see patient this AM  - A 72 hours hold was placed on the patient.     • Bipolar 1 disorder [F31.9]     Overview Note:     Continue home dose of Trileptal, Effexor, and  Hydroxyzine           CURRENT MEDICATIONS:  Prescriptions Prior to Admission   Medication Sig Dispense Refill Last Dose   • doxepin (SINEquan) 10 MG capsule Take 10 mg by mouth Every Night.   4/30/2018 at 1700   • Etonogestrel (NEXPLANON SC) Nexplanon      • hydrOXYzine  (VISTARIL) 50 MG capsule Take 0.5 tablets by mouth 3 (Three) Times a Day As Needed. Take 0.5-1 tablets by mouth three times daily as needed for anxiety.   Past Week at Unknown time   • l-methylfolate 15 MG tablet tablet Take 1 tablet by mouth Daily.   Past Week at Unknown time   • lisdexamfetamine (VYVANSE) 30 MG capsule Take 30 mg by mouth Daily   Past Week at Unknown time   • OXcarbazepine (TRILEPTAL) 150 MG tablet Take 150 mg by mouth 2 (Two) Times a Day.   Past Week at Unknown time   • ranitidine (ZANTAC) 150 MG capsule Take 150 mg by mouth Every 12 (Twelve) Hours.   Past Week at Unknown time   • venlafaxine XR (EFFEXOR XR) 75 MG 24 hr capsule Take 75 mg by mouth Daily.   Past Week at Unknown time       ALLERGIES:  Levaquin [levofloxacin] and Sulfa antibiotics      PAST MEDICAL/SURGICAL HISTORY:  Past Medical History:   Diagnosis Date   • ADHD (attention deficit hyperactivity disorder)    • Anxiety    • Depression    • Suicidal ideation        No past surgical history on file.    Review of Systems   Constitutional: Negative for activity change, appetite change, fatigue and fever.   HENT: Negative for congestion, ear discharge, ear pain, facial swelling, hearing loss, nosebleeds, postnasal drip, rhinorrhea, sinus pressure, sore throat, tinnitus and trouble swallowing.    Eyes: Negative for pain, discharge and visual disturbance.   Respiratory: Negative for cough, shortness of breath and wheezing.    Cardiovascular: Negative for chest pain, palpitations and leg swelling.   Gastrointestinal: Negative for abdominal pain, blood in stool, constipation, diarrhea, nausea and vomiting.   Genitourinary: Negative for difficulty urinating, dyspareunia, dysuria, flank pain, frequency, hematuria, menstrual problem, pelvic pain, urgency, vaginal bleeding and vaginal discharge.   Musculoskeletal: Negative for arthralgias, back pain, joint swelling, myalgias and neck pain.   Skin: Negative for rash and wound.   Neurological:  "Negative for dizziness, seizures, syncope, weakness, light-headedness and headaches.   Hematological: Negative for adenopathy.       Social History     Social History   • Marital status: Single     Spouse name: N/A   • Number of children: N/A   • Years of education: N/A     Occupational History   • Not on file.     Social History Main Topics   • Smoking status: Never Smoker   • Smokeless tobacco: Never Used   • Alcohol use No   • Drug use: Unknown   • Sexual activity: Not on file     Other Topics Concern   • Not on file     Social History Narrative    Substance Abuse:  Cannabis: occasional/rare use        Marriages: 0    Current Relationships: recent relationship with boyfriend    Children: 0        Education: technical college, medical insurance    Occupation: individual, not currently working. Previously worked at [employer] until April 2018, was supposed to start as  at [employer] today (5/1) as     Living Situation: alone.  She was living with her ex.  Not sure where she is going now, has family in [place] and [place], [place]       Family History   Problem Relation Age of Onset   • Anxiety disorder Mother    • Bipolar disorder Mother    • Depression Mother    • Alcohol abuse Father    • Anxiety disorder Father    • No Known Problems Sister    • No Known Problems Brother    • No Known Problems Maternal Aunt    • No Known Problems Paternal Aunt    • No Known Problems Maternal Uncle    • No Known Problems Paternal Uncle    • No Known Problems Maternal Grandfather    • No Known Problems Maternal Grandmother    • No Known Problems Paternal Grandfather    • No Known Problems Paternal Grandmother    • Bipolar disorder Cousin    • Self-Injurious Behavior  Cousin    • Suicide Attempts Cousin              Objective     /55 (BP Location: Right arm, Patient Position: Lying)   Pulse 74   Temp 98.9 °F (37.2 °C) (Tympanic)   Resp 18   Ht 152.4 cm (60\")   Wt 91.6 kg (202 lb)   LMP 04/17/2018   SpO2 99%  "  BMI 39.45 kg/m²     Physical Exam   Constitutional: She appears well-developed and well-nourished.   HENT:   Head: Normocephalic and atraumatic.   Eyes: Conjunctivae and EOM are normal.   Neck: Normal range of motion. Neck supple. No thyromegaly present.   Cardiovascular: Normal rate, regular rhythm and normal heart sounds.  Exam reveals no gallop and no friction rub.    No murmur heard.  Pulmonary/Chest: Effort normal and breath sounds normal. No respiratory distress. She has no wheezes. She has no rales.   Abdominal: Soft. She exhibits no distension and no mass. There is no tenderness. There is no rebound and no guarding.   Musculoskeletal: Normal range of motion.   Lymphadenopathy:     She has no cervical adenopathy.   Neurological: She is alert. She has normal strength and normal reflexes. She displays no tremor and normal reflexes. No cranial nerve deficit or sensory deficit. She exhibits normal muscle tone. Coordination normal. She displays no Babinski's sign on the right side. She displays no Babinski's sign on the left side.   Reflex Scores:       Tricep reflexes are 2+ on the right side and 2+ on the left side.       Bicep reflexes are 2+ on the right side and 2+ on the left side.       Brachioradialis reflexes are 2+ on the right side and 2+ on the left side.       Patellar reflexes are 2+ on the right side and 2+ on the left side.       Achilles reflexes are 2+ on the right side and 2+ on the left side.  Skin: Skin is warm and dry. No rash noted. No erythema.   Nursing note and vitals reviewed.      Dystonia/Tardive Dyskinesia  Absent  Meningeal Signs  Absent    Diagnostic Studies  CBC, CMP, UDS, acetaminophen level, salicylate level, ethanol level, all normal except    CMP normal and CBC normal.  Acetaminophen less than 10, salicylate less than 1, and ethanol less than 10.  Urine drug screen positive only for THC.  TSH, urinalysis, and T4 apparently not done.    EKG shows:  Vent. Rate : 088 BPM      Atrial Rate : 088 BPM     P-R Int : 172 ms          QRS Dur : 086 ms      QT Int : 360 ms       P-R-T Axes : 027 044 046 degrees     QTc Int : 435 ms    Normal sinus rhythm  Normal ECG    Assessment/Plan     No active medical problems.  We will check TSH and T4.        Continue Home Meds as ordered. Mental health and pain issues managed per psychiatry.  Further diagnostic studies or intervention based on hospital course.

## 2018-05-02 NOTE — PLAN OF CARE
Problem: Suicidal Behavior (Adult)  Goal: Suicidal Behavior is Absent/Minimized/Managed  Outcome: Ongoing (interventions implemented as appropriate)      Problem: Mood Impairment (Depressive Signs/Symptoms) (Adult)  Goal: Improved Mood Symptoms (Depressive Signs/Symptoms)  Outcome: Ongoing (interventions implemented as appropriate)      Problem: Decreased Participation/Engagement (Depressive Signs/Symptoms) (Adult)  Goal: Increased Participation/Engagement (Depressive Signs/Symptoms)  Outcome: Ongoing (interventions implemented as appropriate)      Problem: Overarching Goals (Adult)  Goal: Adheres to Safety Considerations for Self and Others  Outcome: Ongoing (interventions implemented as appropriate)    Goal: Optimized Coping Skills in Response to Life Stressors  Outcome: Ongoing (interventions implemented as appropriate)    Goal: Develops/Participates in Therapeutic [place] to Support Successful Transition  Outcome: Ongoing (interventions implemented as appropriate)      Problem: Patient Care Overview  Goal: Discharge Needs Assessment  Outcome: Ongoing (interventions implemented as appropriate)    Goal: Interprofessional Rounds/Family Conf  Outcome: Ongoing (interventions implemented as appropriate)

## 2018-05-02 NOTE — NURSING NOTE
Behavior   Anxiety: Feeling anxious  Depression: anxiety  Pain  0  AVH   no  S/I   no  H/I   no    Affect   calm and pleasant    Note:Pt is alert, oriented x3 verbal and ambulatory. Reports slight anxiety because she has not had her regular medications. I explained the would see her this morning and discuss her meds with her at that time. She verbalized understanding. Will monitor for safety.       Intervention  Instructed in medication usage and effects  Medications administered as ordered  Encouraged to verbalize needs      Response  Verbalized understanding   Did patient take medications as ordered yes   Did patient interact with assessment?  yes     Plan  Will monitor for safety  Will monitor every 15 minutes as ordered  Will evaluate and promote the plan of care

## 2018-05-02 NOTE — DISCHARGE SUMMARY
DISCHARGE SUMMARY    PATIENT NAME: Magdalena Marquis       PHYSICIAN: Marcus Webster MD  : 1995  MRN: [id number]    ADMITTED: 2018     DISCHARGED: 2018    ADMISSION DIAGNOSES:  Active Hospital Problems (** Indicates Principal Problem)    Diagnosis Date Noted   • Suicidal ideation [R45.851] 2018   • Intentional doxepin overdose [T43.012A] 2018   • Bipolar 1 disorder [F31.9] 2018      Resolved Hospital Problems    Diagnosis Date Noted Date Resolved   No resolved problems to display.     DISCHARGE DIAGNOSES:   Active Hospital Problems (** Indicates Principal Problem)    Diagnosis Date Noted   • Suicidal ideation [R45.851] 2018   • Intentional doxepin overdose [T43.012A] 2018   • Bipolar 1 disorder [F31.9] 2018      Resolved Hospital Problems    Diagnosis Date Noted Date Resolved   No resolved problems to display.       SERVICE: Family Medicine.  Attending: Dr. Marcano  Resident: Marcus Webster MD    CONSULTS:   Consult Orders (all)     Start     Ordered    18 0614  Inpatient Psychiatrist Consult  Once     Specialty:  Psychiatry  Provider:  Mishel Barr MD    18 0614    18  Family Practice - Resident (on-call MD unless specified)  Once,   Status:  Canceled     Specialty:  Family Medicine  Provider:  Stephen Garner MD    18              HISTORY OF PRESENT ILLNESS:   Magdalena Marquis is a 23 y.o. female with a CMH of Bipolar disorder and ADHD who presents to the E.D. After ingesting 5 Doxepin 10 mg tablets 20 minutes prior to arrival in a suicide attempt. This was at approximately 1800. The patient reports she has been having worsening depression since the death of her mother in 2016. She has been been fighting with her boyfriend with whom she lives. He broke up with her today, and she decided to take the Doxepin in an attempt to kill herself. She has been having suicidal thoughts for several months. She still has  thoughts of harming herself. She has thought of taking a lot of her pills to kill herself. She reports she has no support system at home and believes she is now homeless, as her now former boyfriend will not let her stay there. She does have a grandmother in [place], [place]. She does feel very tired at this time  H&P shared from Dr. Garner        [facility] COURSE:  Stable on admission with vital signs within normal limits.  All labs were within normal limits except a positive THC level. Dr. Barr, psychiatry was consulted and recommended that patient would benefit from being admitted to the psych unit.  Patient was stable for discharge to the psych unit on 5/1/2018.    DISCHARGE CONDITION:   Medically stable    DISPOSITION:  Psychiatric Hospital or Unit ([facility])    DISCHARGE MEDICATIONS   Magdalena Marquis   Home Medication Instructions [id number]    Printed on:05/02/18 0611   Medication Information                      doxepin (SINEquan) 10 MG capsule  Take 10 mg by mouth Every Night.             Etonogestrel (NEXPLANON SC)  Nexplanon             hydrOXYzine (VISTARIL) 50 MG capsule  Take 0.5 tablets by mouth 3 (Three) Times a Day As Needed. Take 0.5-1 tablets by mouth three times daily as needed for anxiety.             l-methylfolate 15 MG tablet tablet  Take 1 tablet by mouth Daily.             lisdexamfetamine (VYVANSE) 30 MG capsule  Take 30 mg by mouth Daily             OXcarbazepine (TRILEPTAL) 150 MG tablet  Take 150 mg by mouth 2 (Two) Times a Day.             ranitidine (ZANTAC) 150 MG capsule  Take 150 mg by mouth Every 12 (Twelve) Hours.             venlafaxine XR (EFFEXOR XR) 75 MG 24 hr capsule  Take 75 mg by mouth Daily.                 INSTRUCTIONS:  Activity:   Activity Instructions     Activity as Tolerated           Diet:   Diet Instructions     Diet: Regular       Discharge Diet:  Regular        Special instructions: Patient instructed to call MD or return to ED with  worsening shortness of breath, chest pain, fever greater than 100.4 degrees F or any other medical concerns..    FOLLOW UP:    Contact information for follow-up providers     No Known Provider .    Contact information:  [facility] [id number]                   Contact information for after-discharge care     Destination     [facility] .    Specialty:  Psychiatry  Contact information:  [address] [place] [id number] [phone]                             PENDING TEST RESULTS AT DISCHARGE   Order Current Status    THC (marijuana), Urine, Confirmation - Urine, Clean Catch In process          Time: Discharge 30 min    Dr. Marcano is the attending at time of discharge, He is aware of the patient's status and agrees with the above discharge summary.          This document has been electronically signed by Marcus Webster MD on May 2, 2018 6:11 AM

## 2018-05-02 NOTE — CONSULTS
Adult Nutrition  Assessment    Patient Name:  Magdalena Marquis  YOB: 1995  MRN: [id number]  Admit Date:  5/1/2018    Assessment Date:  5/2/2018    Comments:  Pt reports poor appetite.  Reports 40 lb wt loss since 12/2017 due to decreased appetite.  Voiced food preferences.  Willing to receive Ensure with meals.  Intake 75% - 1x, 50% - 1x.  Pt reports having nausea.  Pepcid, PRN Zofran prescribed.  Labs reviewed.          Adult Nutrition Assessment     Row Name 05/02/18 1441       PO Evaluation    Number of Meals 2    % PO Intake 75% - 1x, 50% - 1x    Row Name 05/02/18 1440       Nutrition Prescription PO    Current PO Diet Regular    Row Name 05/02/18 1437       Reason for Assessment    Reason For Assessment identified at risk by screening criteria    Identified At Risk by Screening Criteria MST SCORE 2+       Labs/Procedures/Meds    Lab Results Reviewed reviewed       Calculation Measurements    Weight Used For Calculations 57 kg (125 lb 10.6 oz)       Estimated/Assessed Needs    Additional Documentation Calorie Requirements (Group);Fluid Requirements (Group);[place]-St. Jeor Equation (Group);Protein Requirements (Group)       Calorie Requirements    Estimated Calorie Requirement (kcal/day) 2159    Estimated Calorie Need Method [place]-St Jeor       KCAL/KG    14 Kcal/Kg (kcal) 798    15 Kcal/Kg (kcal) 855    18 Kcal/Kg (kcal) 1026    20 Kcal/Kg (kcal) 1140    25 Kcal/Kg (kcal) 1425    30 Kcal/Kg (kcal) 1710    35 Kcal/Kg (kcal) 1995    40 Kcal/Kg (kcal) 2280    45 Kcal/Kg (kcal) 2565    50 Kcal/Kg (kcal) 2850       [place]-St. Jeor Equation    RMR ([place]-St. Jeor Equation) 1251.5       Protein Requirements    Est Protein Requirement Amount (gms/kg) 1.2 gm protein    Estimated Protein Requirements (gms/day) 68.4       Fluid Requirements    Shaan-Segar Method (over 20 kg) 2640          Electronically signed by:  Tere Frank RD  05/02/18 2:42 PM
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
## 2018-05-03 LAB
CANNABINOIDS UR QL CFM: POSITIVE
Lab: ABNORMAL
THC UR CFM-MCNC: 183 NG/ML

## 2018-05-03 PROCEDURE — 99232 SBSQ HOSP IP/OBS MODERATE 35: CPT | Performed by: PSYCHIATRY & NEUROLOGY

## 2018-05-03 RX ORDER — QUETIAPINE FUMARATE 100 MG/1
200 TABLET, FILM COATED ORAL NIGHTLY
Status: DISCONTINUED | OUTPATIENT
Start: 2018-05-03 | End: 2018-05-04

## 2018-05-03 RX ORDER — CETIRIZINE HYDROCHLORIDE 10 MG/1
10 TABLET ORAL DAILY
Status: DISCONTINUED | OUTPATIENT
Start: 2018-05-03 | End: 2018-05-07 | Stop reason: HOSPADM

## 2018-05-03 RX ADMIN — ACETAMINOPHEN 650 MG: 325 TABLET ORAL at 14:07

## 2018-05-03 RX ADMIN — CETIRIZINE HYDROCHLORIDE 10 MG: 10 TABLET, FILM COATED ORAL at 14:30

## 2018-05-03 RX ADMIN — FAMOTIDINE 20 MG: 20 TABLET ORAL at 20:15

## 2018-05-03 RX ADMIN — HYDROXYZINE PAMOATE 50 MG: 50 CAPSULE ORAL at 17:33

## 2018-05-03 RX ADMIN — FAMOTIDINE 20 MG: 20 TABLET ORAL at 08:07

## 2018-05-03 RX ADMIN — QUETIAPINE 200 MG: 100 TABLET ORAL at 20:15

## 2018-05-03 NOTE — PROGRESS NOTES
Met with patient to complete Recreation Therapy Assessment.  Patient states she has stopped doing most of the things she used to do.  She reports that she has low self esteem.  She does have a work history.  She reports that she would like to learn ways to cope with her grief and stress.  Patient will be encouraged to participate in all groups and verbalize understanding of appropriate coping skills.

## 2018-05-03 NOTE — PLAN OF CARE
Problem: Overarching Goals (Adult)  Goal: Adheres to Safety Considerations for Self and Others  Outcome: Ongoing (interventions implemented as appropriate)    Goal: Optimized Coping Skills in Response to Life Stressors  Outcome: Ongoing (interventions implemented as appropriate)    Goal: Develops/Participates in Therapeutic [place] to Support Successful Transition  Outcome: Ongoing (interventions implemented as appropriate)   05/02/18 0220   Overarching Goals (Adult)   Develops/Participates in Therapeutic [place] to Support Successful Transition making progress toward outcome       Problem: Patient Care Overview  Goal: Plan of Care Review  Outcome: Ongoing (interventions implemented as appropriate)    Goal: Discharge Needs Assessment  Outcome: Ongoing (interventions implemented as appropriate)   05/01/18 1740 05/03/18 0348   Discharge Needs Assessment   Concerns to be Addressed --  adjustment to diagnosis/illness   Patient/Family Anticipates Transition to home with family --    Patient/Family Anticipated Services at Transition mental health services --    Transportation Anticipated car, drives self --      Goal: Interprofessional Rounds/Family Conf  Outcome: Ongoing (interventions implemented as appropriate)

## 2018-05-03 NOTE — NURSING NOTE
Behavior   Anxiety: Difficulty concentrating  Depression: difficulty concentrating  Pain  0  AVH   no  S/I   no  H/I   no    Affect   calm and pleasant    Note:Pt is alert, oriented x3 verbal and ambulatory. Cooperative and compliant. Appropriate interaction with staff and peers. Denies anxiety or depression at this time. Pt has a goal to go to a residential facility in [place] to help her learn to live on her own. Will monitor for safety.       Intervention  Instructed in medication usage and effects  Medications administered as ordered  Encouraged to verbalize needs      Response  Verbalized understanding   Did patient take medications as ordered yes   Did patient interact with assessment?  yes     Plan  Will monitor for safety  Will monitor every 15 minutes as ordered  Will evaluate and promote the plan of care

## 2018-05-03 NOTE — SIGNIFICANT NOTE
"   05/03/18 1542   Individual Counseling   Length of Session 20   Topic Progress   Patient Response CSW approaced by pt to discuss disposition. Pt plesant and cooperative, presents with good mood, bright affect. Pt stated \"I found a place I want to go when I leave here.\" Pt reports that she has spoken with family and is interested in going to \"girls home\" in [facility] that appears to be for independent living and that cna help teach life skills. CSW called [facility] with pt present and discussed their program and process for applying. Pt verblaized her interest in applying for program. CSW to inform nursing staff to work with pt tonight on the computer and allow her to complete the online application.      "

## 2018-05-03 NOTE — NURSING NOTE
Behavior   Anxiety: Feeling anxious  Depression: anxiety  Pain  0  AVH   no  S/I   no  H/I   no    Affect   labile    Note:pt in hallway waiting on writer to take shower, stated anxiety has not gotten any better today even after Vistaril given at 1600, pt stated didn't touch anxiety. Pt stated depression is better.  Stated the Seroquel usually helps for sleep.  Will let writer know later if something for sleep is needed or anxiety    Intervention  Instructed in medication usage and effects  Medications administered as ordered  Encouraged to verbalize needs      Response  Verbalized understanding   Did patient take medications as ordered yes   Did patient interact with assessment?  yes     Plan  Will monitor for safety  Will monitor every 15 minutes as ordered  Will evaluate and promote the plan of care

## 2018-05-03 NOTE — PROGRESS NOTES
5/3/2018    Chief Complaint: suicide attempt    Subjective:  Patient is a 23 y.o. female who was hospitalized for suicide attempt.       She notes that she tolerated the seroquel last night.  She notes that it was not too sedating.  She notes it took her a bit to fall asleep but slept well once she fell asleep.    Objective     Vital Signs    Temp:  [98 °F (36.7 °C)-98.3 °F (36.8 °C)] 98 °F (36.7 °C)  Heart Rate:  [86-87] 86  Resp:  [18] 18  BP: (119-131)/(53-75) 119/53    Physical Exam:   General Appearance: alert, appears stated age and cooperative,  Hygiene:   good  Gait & Station: Normal  Musculoskeletal: No tremors or abnormal involuntary movements    Mental Status Exam:   Cooperation:  Cooperative  Eye Contact:  Good  Psychomotor Behavior:  Appropriate  Mood: Improving  Affect:  mood-congruent  Speech:  Normal  Thought Process:  Coherent  Associations: Goal Directed  Thought Content:     Normal   Suicidal:  None   Homicidal:  None   Hallucinations:  None   Delusion:  None  Cognitive Functioning:   Consciousness: awake, alert and oriented  Reliability:  good  Insight:  Fair  Judgement:  Fair  Impulse Control:  Fair    Lab Results (last 24 hours)     ** No results found for the last 24 hours. **        Imaging Results (last 24 hours)     ** No results found for the last 24 hours. **          Medicine:   Current Facility-Administered Medications:   •  acetaminophen (TYLENOL) tablet 650 mg, 650 mg, Oral, Q4H PRN, Mishel Barr MD  •  aluminum-magnesium hydroxide-simethicone (MAALOX MAX) 400-400-40 MG/5ML suspension 15 mL, 15 mL, Oral, Q6H PRN, Mishel Barr MD  •  famotidine (PEPCID) tablet 20 mg, 20 mg, Oral, BID, Mishel Barr MD, 20 mg at 05/03/18 0807  •  hydrOXYzine (VISTARIL) capsule 50 mg, 50 mg, Oral, Q6H PRN, Mishel Barr MD, 50 mg at 05/02/18 1556  •  loperamide (IMODIUM) capsule 2 mg, 2 mg, Oral, 4x Daily PRN, Mishel Barr MD  •  magnesium hydroxide (MILK OF MAGNESIA)  suspension 2400 mg/10mL 10 mL, 10 mL, Oral, Daily PRN, Mishel Barr MD  •  ondansetron (ZOFRAN) tablet 4 mg, 4 mg, Oral, Q6H PRN, Mishel Barr MD  •  QUEtiapine (SEROquel) tablet 100 mg, 100 mg, Oral, Nightly, Mishel Barr MD, 100 mg at 05/02/18 2014    Diagnoses/Assessment:   Principal Problem:    Bipolar 1 disorder, depressed, severe  Active Problems:    Suicide attempt by drug ingestion      Treatment Plan:    1) Will continue care for the patient on the behavioral health unit at [facility] to ensure patient safety.  2) Will continue to provide treatment with the unit milieu, activities, therapies and psychopharmacological management.  3) Patient to be placed on or continued on  Q15 minute checks  and Suicide precautions.  4) Pertinent medical issues:Allegries.  Will start zyrtec.  5) Will order following labs: none  6) Will make the following medication changes: Will increase the seroquel to 200mg qhs.  Will continue the vistaril for anxiety.   7) Will continue discharge planning as appropriate for patient.  8) Psychotherapy provided for less than 16 minutes.    Treatment plan and medication risks and benefits discussed with: Patient    Mishel Barr MD  05/03/18  1:52 PM

## 2018-05-04 PROCEDURE — 99232 SBSQ HOSP IP/OBS MODERATE 35: CPT | Performed by: PSYCHIATRY & NEUROLOGY

## 2018-05-04 RX ORDER — QUETIAPINE FUMARATE 25 MG/1
25 TABLET, FILM COATED ORAL 2 TIMES DAILY PRN
Status: DISCONTINUED | OUTPATIENT
Start: 2018-05-04 | End: 2018-05-07 | Stop reason: HOSPADM

## 2018-05-04 RX ORDER — QUETIAPINE FUMARATE 300 MG/1
300 TABLET, FILM COATED ORAL NIGHTLY
Status: DISCONTINUED | OUTPATIENT
Start: 2018-05-04 | End: 2018-05-07 | Stop reason: HOSPADM

## 2018-05-04 RX ADMIN — FAMOTIDINE 20 MG: 20 TABLET ORAL at 08:52

## 2018-05-04 RX ADMIN — FAMOTIDINE 20 MG: 20 TABLET ORAL at 20:42

## 2018-05-04 RX ADMIN — QUETIAPINE FUMARATE 300 MG: 300 TABLET ORAL at 20:43

## 2018-05-04 RX ADMIN — CETIRIZINE HYDROCHLORIDE 10 MG: 10 TABLET, FILM COATED ORAL at 08:52

## 2018-05-04 RX ADMIN — HYDROXYZINE PAMOATE 50 MG: 50 CAPSULE ORAL at 08:51

## 2018-05-04 NOTE — PLAN OF CARE
Problem: Patient Care Overview  Goal: Plan of Care Review  Outcome: Ongoing (interventions implemented as appropriate)  Encourage intake of meals and supplement.

## 2018-05-04 NOTE — NURSING NOTE
Behavior   Anxiety: Feeling anxious  Depression: insomnia  Pain  0  AVH   no  S/I   no  H/I   no    Affect   anxious    Note:pt sitting at table with peers, stated had troubles going to sleep last night, but slept good once asleep.  Did feel somewhat hung over this am.  Pt stated the Vistaril did not help yesterday with anxiety , but did help today.  Stated again that previous MD called Aunt to not visit patient. Discussion of Seroquel.      Intervention  Instructed in medication usage and effects  Medications administered as ordered  Encouraged to verbalize needs      Response  Verbalized understanding   Did patient take medications as ordered yes   Did patient interact with assessment?  yes     Plan  Will monitor for safety  Will monitor every 15 minutes as ordered  Will evaluate and promote the plan of care

## 2018-05-04 NOTE — PLAN OF CARE
Problem: Overarching Goals (Adult)  Goal: Adheres to Safety Considerations for Self and Others  Outcome: Ongoing (interventions implemented as appropriate)    Goal: Optimized Coping Skills in Response to Life Stressors  Outcome: Ongoing (interventions implemented as appropriate)    Goal: Develops/Participates in Therapeutic [place] to Support Successful Transition  Outcome: Ongoing (interventions implemented as appropriate)      Problem: Patient Care Overview  Goal: Plan of Care Review  Outcome: Ongoing (interventions implemented as appropriate)    Goal: Individualization and Mutuality  Outcome: Ongoing (interventions implemented as appropriate)    Goal: Discharge Needs Assessment  Outcome: Ongoing (interventions implemented as appropriate)    Goal: Interprofessional Rounds/Family Conf  Outcome: Ongoing (interventions implemented as appropriate)

## 2018-05-04 NOTE — NURSING NOTE
"Behavior   Anxiety: Feeling anxious  Depression: anxiety  Pain  0  AVH   no  S/I   no  H/I   no    Affect   anxious    Note: Patient has been up in hallway, interacting with staff and peers. Patient reports she started feeling anxious this morning, unable to identify a trigger,but state significant anxiety, (8/10), request PRN medication for anxiety. Patient states her heart is \"beating fast and I just started feeling anxious\". PRN medication given, discussed using distraction to help decrease anxiety. Discussed coping skills and patient's goals for treatment. Patient agrees to notify staff of any concerns/needs. Patient continues to work toward treatment goals      Intervention  Instructed in medication usage and effects  Medications administered as ordered  Encouraged to verbalize needs      Response  Verbalized understanding   Did patient take medications as ordered yes   Did patient interact with assessment?  yes     Plan  Will monitor for safety  Will monitor every 15 minutes as ordered  Will evaluate and promote the plan of care    "

## 2018-05-04 NOTE — CONSULTS
Adult Nutrition  Assessment    Patient Name:  Magdalena Marquis  YOB: 1995  MRN: [id number]  Admit Date:  5/1/2018    Assessment Date:  5/4/2018    Comments:  Pt indicates her appetite varies.  Voiced no food preferences.  Intake 1005 - 3x, 75% -1x, 25% - 1x.  Pt reports having nausea.  Pepcid, PRN Zofran prescribed.  Labs reviewed.          Adult Nutrition Assessment     Row Name 05/04/18 1716       Nutrition Prescription PO    Current PO Diet Regular    Supplement Ensure Enlive    Supplement Frequency Daily       PO Evaluation    Number of Meals 5    % PO Intake 100% - 3x, 75% - 1x, 25% - 1x    Row Name 05/04/18 1715       Reason for Assessment    Reason For Assessment follow-up protocol       Labs/Procedures/Meds    Lab Results Reviewed reviewed          Electronically signed by:  Tere Frank RD  05/04/18 5:18 PM

## 2018-05-04 NOTE — PLAN OF CARE
"Problem: Suicidal Behavior (Adult)  Goal: Suicidal Behavior is Absent/Minimized/Managed  Outcome: Ongoing (interventions implemented as appropriate)  Patient denies any thoughts of self harm or suicide at this time    Problem: Mood Impairment (Depressive Signs/Symptoms) (Adult)  Goal: Improved Mood Symptoms (Depressive Signs/Symptoms)  Outcome: Ongoing (interventions implemented as appropriate)  Patient states she is feeling anxious (8/10) this morning, denies a trigger, states she was sitting with peers and started to feel her heart \"beating faster and then I started feeling anxious\".    Problem: Decreased Participation/Engagement (Depressive Signs/Symptoms) (Adult)  Goal: Increased Participation/Engagement (Depressive Signs/Symptoms)  Outcome: Ongoing (interventions implemented as appropriate)  Patient interactive with staff and peers. Participates well in activities    Problem: Overarching Goals (Adult)  Goal: Adheres to Safety Considerations for Self and Others  Outcome: Outcome(s) achieved Date Met: 05/04/18    Goal: Optimized Coping Skills in Response to Life Stressors  Outcome: Ongoing (interventions implemented as appropriate)  Patient voiced concerns with feeling increases anxiety to staff and asked for an \"anxiety pill\".   Goal: Develops/Participates in Therapeutic [place] to Support Successful Transition  Outcome: Ongoing (interventions implemented as appropriate)      Problem: Patient Care Overview  Goal: Plan of Care Review  Outcome: Ongoing (interventions implemented as appropriate)        "

## 2018-05-05 PROCEDURE — 99232 SBSQ HOSP IP/OBS MODERATE 35: CPT | Performed by: PSYCHIATRY & NEUROLOGY

## 2018-05-05 RX ADMIN — QUETIAPINE FUMARATE 25 MG: 25 TABLET ORAL at 11:07

## 2018-05-05 RX ADMIN — FAMOTIDINE 20 MG: 20 TABLET ORAL at 08:15

## 2018-05-05 RX ADMIN — QUETIAPINE FUMARATE 300 MG: 300 TABLET ORAL at 21:17

## 2018-05-05 RX ADMIN — CETIRIZINE HYDROCHLORIDE 10 MG: 10 TABLET, FILM COATED ORAL at 08:15

## 2018-05-05 RX ADMIN — FAMOTIDINE 20 MG: 20 TABLET ORAL at 21:17

## 2018-05-05 RX ADMIN — ACETAMINOPHEN 650 MG: 325 TABLET ORAL at 18:42

## 2018-05-05 NOTE — PLAN OF CARE
Problem: Suicidal Behavior (Adult)  Goal: Suicidal Behavior is Absent/Minimized/Managed  Outcome: Ongoing (interventions implemented as appropriate)      Problem: Mood Impairment (Depressive Signs/Symptoms) (Adult)  Goal: Improved Mood Symptoms (Depressive Signs/Symptoms)  Outcome: Ongoing (interventions implemented as appropriate)      Problem: Decreased Participation/Engagement (Depressive Signs/Symptoms) (Adult)  Goal: Increased Participation/Engagement (Depressive Signs/Symptoms)  Outcome: Ongoing (interventions implemented as appropriate)

## 2018-05-05 NOTE — NURSING NOTE
"Behavior   Anxiety: Feeling anxious  Depression: anxiety  Pain   0  AVH   no  S/I   no  H/I   no  Affect   Anxious    Patient is out in day area interacting with peers. Reports that she had a good visit with her aunt today. Voices that she feels anxious tonight, stating \"I just woke up like this for some reason, nothing really happened that made me anxious\". Seroquel was increased to 300mg, first dose tonight. Med compliant. Ate snack. Will continue to monitor.     Intervention  Medications reviewed and administered  Assessment complete    Response  Verbalized understanding   Took medications  Interacted with assessment    Plan  Will promote and reinforce current treatment plan and encourage involvement in care plan goals.   Will provide for safe, calm, quiet environment.  Will promote open communication with staff and foster a trusting/working relationship with patient.   Will promote participation in groups and therapies and independent reflection.      "

## 2018-05-05 NOTE — PROGRESS NOTES
5/5/2018     Chief Complaint: I was admitted because of suicide attempt but I am not any more . I think that was stupid and will never think about that.     Subjective:  Patient is a 23 y.o. female who was hospitalized for suicide attempt.     She notes that she tolerated the seroquel last night.  She notes that it was not too sedating.  She notes it took her a bit to fall asleep due to anxiety at night.  She notes panic attack this morning.        Objective         Vital Signs     Temp:  [97.4 °F (36.3 °C)-98.2 °F (36.8 °C)] 97.4 °F (36.3 °C)  Heart Rate:  [80-86] 86  Resp:  [18-20] 20  BP: (121-129)/(75-82) 129/75     Physical Exam:   General Appearance: alert, appears stated age and cooperative,  Hygiene:   good  Gait & Station: Normal  Musculoskeletal: No tremors or abnormal involuntary movements     Mental Status Exam:   Cooperation:  Cooperative  Eye Contact:  Good  Psychomotor Behavior:  Appropriate  Mood: Anxious/Nervous  Affect:  normal  Speech:  Normal  Thought Process:  Coherent  Associations: Goal Directed  Thought Content:                Normal              Suicidal:  None              Homicidal:  None              Hallucinations:  None              Delusion:  None  Cognitive Functioning:              Consciousness: awake, alert and oriented  Reliability:  good  Insight:  Fair  Judgement:  Fair  Impulse Control:  Fair         Lab Results (last 24 hours)      ** No results found for the last 24 hours. **          Imaging Results (last 24 hours)      ** No results found for the last 24 hours. **             Medicine:   Current Facility-Administered Medications:   •  acetaminophen (TYLENOL) tablet 650 mg, 650 mg, Oral, Q4H PRN, Mishel Barr MD, 650 mg at 05/03/18 1407  •  aluminum-magnesium hydroxide-simethicone (MAALOX MAX) 400-400-40 MG/5ML suspension 15 mL, 15 mL, Oral, Q6H PRN, Mishel Barr MD  •  cetirizine (zyrTEC) tablet 10 mg, 10 mg, Oral, Daily, Mishel aBrr MD, 10 mg at 05/04/18  0852  •  famotidine (PEPCID) tablet 20 mg, 20 mg, Oral, BID, Mishel Barr MD, 20 mg at 05/04/18 0852  •  hydrOXYzine (VISTARIL) capsule 50 mg, 50 mg, Oral, Q6H PRN, Mishel Barr MD, 50 mg at 05/04/18 0851  •  loperamide (IMODIUM) capsule 2 mg, 2 mg, Oral, 4x Daily PRN, Mishel Barr MD  •  magnesium hydroxide (MILK OF MAGNESIA) suspension 2400 mg/10mL 10 mL, 10 mL, Oral, Daily PRN, Mishel Barr MD  •  ondansetron (ZOFRAN) tablet 4 mg, 4 mg, Oral, Q6H PRN, Mishel Barr MD  •  QUEtiapine (SEROquel) tablet 200 mg, 200 mg, Oral, Nightly, Mishel Barr MD, 200 mg at 05/03/18 2015     Diagnoses/Assessment:   Principal Problem:    Bipolar 1 disorder, depressed, severe  Active Problems:    Suicide attempt by drug ingestion        Treatment Plan:     1) Will continue care for the patient on the behavioral health unit at [facility] to ensure patient safety.  2) Will continue to provide treatment with the unit milieu, activities, therapies and psychopharmacological management.  3) Patient to be placed on or continued on  Q15 minute checks  and Suicide precautions.  4) Pertinent medical issues: Allegries.  Will cont zyrtec.  5) Will order following labs: none  6) Will make the following medication changes: Will increase the seroquel to 300mg qhs.  Will stop the vistaril and start seroquel 25mg bid prn.   7) Will continue discharge planning as appropriate for patient.  8) Psychotherapy provided for less than 16 minutes.     Solis Adams MD  05/05/18  11:24 AM

## 2018-05-05 NOTE — NURSING NOTE
Behavior   Anxiety: Difficulty concentrating  Depression: difficulty concentrating  Pain  0  AVH   no  S/I   no  H/I   no    Affect   calm and pleasant    Note:Pt is alert, oriented x3 verbal and ambulatory. Cooperative and compliant. Appropriate interaction with staff and peers. Denies anxiety or depression this morning. Will monitor for safety.       Intervention  Instructed in medication usage and effects  Medications administered as ordered  Encouraged to verbalize needs      Response  Verbalized understanding   Did patient take medications as ordered yes   Did patient interact with assessment?  yes     Plan  Will monitor for safety  Will monitor every 15 minutes as ordered  Will evaluate and promote the plan of care

## 2018-05-06 PROCEDURE — 99232 SBSQ HOSP IP/OBS MODERATE 35: CPT | Performed by: PSYCHIATRY & NEUROLOGY

## 2018-05-06 RX ADMIN — QUETIAPINE FUMARATE 25 MG: 25 TABLET ORAL at 10:29

## 2018-05-06 RX ADMIN — FAMOTIDINE 20 MG: 20 TABLET ORAL at 08:27

## 2018-05-06 RX ADMIN — QUETIAPINE FUMARATE 300 MG: 300 TABLET ORAL at 20:39

## 2018-05-06 RX ADMIN — CETIRIZINE HYDROCHLORIDE 10 MG: 10 TABLET, FILM COATED ORAL at 08:27

## 2018-05-06 RX ADMIN — FAMOTIDINE 20 MG: 20 TABLET ORAL at 20:39

## 2018-05-06 RX ADMIN — ACETAMINOPHEN 650 MG: 325 TABLET ORAL at 09:58

## 2018-05-06 NOTE — NURSING NOTE
Patient is currently resting  She has had intermittent sleep throughout the night.  She seems to have difficulty with falling asleep and staying asleep all night  No PRN medications were utilized this shift
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
## 2018-05-06 NOTE — PLAN OF CARE
Problem: Suicidal Behavior (Adult)  Goal: Suicidal Behavior is Absent/Minimized/Managed  Outcome: Ongoing (interventions implemented as appropriate)   05/05/18 2117   OTHER   Action Step/Short Term Goal (STG) Established 05/03/18   Suicidal Behavior is Absent/Minimized/Managed   Suicidal Behavior Managed/Minimized Time Frame for Action Step (STG) 3 days   Suicidal Behavior Managed/Minimized Action Step (STG) Outcome achieves outcome       Problem: Overarching Goals (Adult)  Goal: Optimized Coping Skills in Response to Life Stressors  Outcome: Ongoing (interventions implemented as appropriate)   05/05/18 2117   Overarching Goals (Adult)   Optimized Coping Skills in Response to Life Stressors making progress toward outcome     Intervention: Promote Effective Coping Strategies   05/05/18 2117   Coping/Psychosocial Interventions   Supportive Measures active listening utilized;self-reflection promoted;verbalization of feelings encouraged       Goal: Develops/Participates in Therapeutic [place] to Support Successful Transition  Outcome: Ongoing (interventions implemented as appropriate)   05/05/18 2117   Overarching Goals (Adult)   Develops/Participates in Therapeutic [place] to Support Successful Transition making progress toward outcome     Intervention: Foster Therapeutic [place]   05/05/18 2117   Interventions   Trust Relationship/Rapport care explained;choices provided;empathic listening provided;questions answered;questions encouraged;thoughts/feelings acknowledged;reassurance provided

## 2018-05-06 NOTE — PROGRESS NOTES
5/6/2018     Chief Complaint: I am doing much better If these abnormal clinical findings persist, appropriate workup will be completed. The patient understands that follow up is required to elucidate the situation. Slept very well last night. My medication is working good.I am ready to go home tomorrow.     Subjective:  Patient is a 23 y.o. female who was hospitalized for suicide attempt.     She notes that she tolerated the seroquel last night.  She notes that it was not too sedating.  She notes it took her a bit to fall asleep due to anxiety at night.  She notes panic attack this morning.        Objective         Vital Signs     Temp:  [97.4 °F (36.3 °C)-98.2 °F (36.8 °C)] 97.4 °F (36.3 °C)  Heart Rate:  [80-86] 86  Resp:  [18-20] 20  BP: (121-129)/(75-82) 129/75     Physical Exam:   General Appearance: alert, appears stated age and cooperative,  Hygiene:   good  Gait & Station: Normal  Musculoskeletal: No tremors or abnormal involuntary movements     Mental Status Exam:   Cooperation:  Cooperative  Eye Contact:  Good  Psychomotor Behavior:  Appropriate  Mood: good  Affect:  normal  Speech:  Normal  Thought Process:  Coherent  Associations: Goal Directed  Thought Content:                Normal              Suicidal:  None              Homicidal:  None              Hallucinations:  None              Delusion:  None  Cognitive Functioning:              Consciousness: awake, alert and oriented  Reliability:  good  Insight:  Fair  Judgement:  Fair  Impulse Control:  Fair           Lab Results (last 24 hours)      ** No results found for the last 24 hours. **              Imaging Results (last 24 hours)      ** No results found for the last 24 hours. **             Medicine:   Current Facility-Administered Medications:   •  acetaminophen (TYLENOL) tablet 650 mg, 650 mg, Oral, Q4H PRN, Mishel Barr MD, 650 mg at 05/03/18 1407  •  aluminum-magnesium hydroxide-simethicone (MAALOX MAX) 400-400-40 MG/5ML suspension 15  mL, 15 mL, Oral, Q6H PRN, Mishel Barr MD  •  cetirizine (zyrTEC) tablet 10 mg, 10 mg, Oral, Daily, Mishel Barr MD, 10 mg at 05/04/18 0852  •  famotidine (PEPCID) tablet 20 mg, 20 mg, Oral, BID, Mishel Barr MD, 20 mg at 05/04/18 0852  •  hydrOXYzine (VISTARIL) capsule 50 mg, 50 mg, Oral, Q6H PRN, Mishel Barr MD, 50 mg at 05/04/18 0851  •  loperamide (IMODIUM) capsule 2 mg, 2 mg, Oral, 4x Daily PRN, Mishel Barr MD  •  magnesium hydroxide (MILK OF MAGNESIA) suspension 2400 mg/10mL 10 mL, 10 mL, Oral, Daily PRN, Mishel Barr MD  •  ondansetron (ZOFRAN) tablet 4 mg, 4 mg, Oral, Q6H PRN, Mishel Barr MD  •  QUEtiapine (SEROquel) tablet 200 mg, 200 mg, Oral, Nightly, Mishel Barr MD, 200 mg at 05/03/18 2015     Diagnoses/Assessment:   Principal Problem:    Bipolar 1 disorder, depressed, severe  Active Problems:    Suicide attempt by drug ingestion        Treatment Plan:     1) Will continue care for the patient on the behavioral health unit at [facility] to ensure patient safety.  2) Will continue to provide treatment with the unit milieu, activities, therapies and psychopharmacological management.  3) Patient to be placed on or continued on  Q15 minute checks  and Suicide precautions.  4) Pertinent medical issues: Allegries.  Will cont zyrtec.  5) Will order following labs: none  6) Will make the following medication changes: Will increase the seroquel to 300mg qhs.  Will stop the vistaril and start seroquel 25mg bid prn.   7) Will continue discharge planning as appropriate for patient.  8) Psychotherapy provided for less than 16 minutes.     Solis Adams MD  05/06/18  8;15 AM

## 2018-05-06 NOTE — PLAN OF CARE
Problem: Overarching Goals (Adult)  Goal: Optimized Coping Skills in Response to Life Stressors  Outcome: Ongoing (interventions implemented as appropriate)    Goal: Develops/Participates in Therapeutic [place] to Support Successful Transition  Outcome: Ongoing (interventions implemented as appropriate)      Problem: Patient Care Overview  Goal: Plan of Care Review  Outcome: Ongoing (interventions implemented as appropriate)   05/06/18 1545   Coping/Psychosocial   Plan of Care Reviewed With patient   Plan of Care Review   Progress improving   Coping/Psychosocial   Patient Agreement with Plan of Care agrees     Goal: Individualization and Mutuality  Outcome: Ongoing (interventions implemented as appropriate)    Goal: Discharge Needs Assessment  Outcome: Ongoing (interventions implemented as appropriate)
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
## 2018-05-07 VITALS
DIASTOLIC BLOOD PRESSURE: 58 MMHG | TEMPERATURE: 97.1 F | SYSTOLIC BLOOD PRESSURE: 105 MMHG | HEIGHT: 60 IN | WEIGHT: 202 LBS | HEART RATE: 76 BPM | BODY MASS INDEX: 39.66 KG/M2 | RESPIRATION RATE: 18 BRPM | OXYGEN SATURATION: 98 %

## 2018-05-07 PROCEDURE — 99239 HOSP IP/OBS DSCHRG MGMT >30: CPT | Performed by: PSYCHIATRY & NEUROLOGY

## 2018-05-07 RX ORDER — CETIRIZINE HYDROCHLORIDE 10 MG/1
10 TABLET ORAL DAILY
Qty: 30 TABLET | Refills: 0 | Status: SHIPPED | OUTPATIENT
Start: 2018-05-08 | End: 2021-04-13

## 2018-05-07 RX ORDER — QUETIAPINE FUMARATE 25 MG/1
25 TABLET, FILM COATED ORAL DAILY PRN
Qty: 30 TABLET | Refills: 0 | Status: SHIPPED | OUTPATIENT
Start: 2018-05-07 | End: 2021-04-13

## 2018-05-07 RX ORDER — QUETIAPINE FUMARATE 300 MG/1
300 TABLET, FILM COATED ORAL NIGHTLY
Qty: 30 TABLET | Refills: 0 | Status: SHIPPED | OUTPATIENT
Start: 2018-05-07 | End: 2021-04-13

## 2018-05-07 RX ADMIN — FAMOTIDINE 20 MG: 20 TABLET ORAL at 08:07

## 2018-05-07 RX ADMIN — CETIRIZINE HYDROCHLORIDE 10 MG: 10 TABLET, FILM COATED ORAL at 08:08

## 2018-05-07 NOTE — NURSING NOTE
Behavior   Anxiety: Difficulty concentrating  Depression: difficulty concentrating  Pain  0  AVH   no  S/I   no  H/I   no    Affect   calm and pleasant    Note:Pt is alert, oriented x3 verbal and ambulatory. Appropriate interaction with staff and peers. Encouraged to attend groups and activities Took meds as ordered. Will monitor for safety.       Intervention  Instructed in medication usage and effects  Medications administered as ordered  Encouraged to verbalize needs      Response  Verbalized understanding   Did patient take medications as ordered yes   Did patient interact with assessment?  yes     Plan  Will monitor for safety  Will monitor every 15 minutes as ordered  Will evaluate and promote the plan of care

## 2018-05-07 NOTE — SIGNIFICANT NOTE
"   05/07/18 3842   Family Counseling   Length of Session 60   Participants grandparent;other (see comments)   Topic Safety/dc Plan   Patient Response CSW met with pt, her grandmother, and her aunt to review safety/dc plan. Pt presented to the interview room, dressed casually, alert and oriented x3. Mood is fair, affect is congruent. pt denies SI/Hi, AVh. Pt states \"I feel much better since being here. I tried to die and I never want to get to that place again.\" Family present to voice their concern re: pt's hx of mood instability. Pt's family discussed their concern re: pt's relationships with her previous boyfriends and poor decision making ability. CSW discussed pt's dx of bipolar disorder and educated on signs and symptoms of bioplar, as well as effective treatment approaches. MD stepped in to discuss medication management. CSW also discussed alternative coping methods. Pt continues to voice that she feels better and is confident that she is ready to move forward. Plan is for pt to stay with her aunt and continue the application process for group home in [place] called [facility]. Family confirmed they will help facilitate pt applying for group home. Pt to be scheduled at [facility] for therapy and med management until placement is secured. Pt has participated well in individual and group tx, was med compliant. Insight and judgement are fair. PT educated on Crisis Hotline, advised to call as needed     "

## 2018-05-07 NOTE — NURSING NOTE
"Behavior   Anxiety: Feeling anxious, Feeling worried and Feeling irritable  Depression: depressed mood, feelings of worthlessness/guilt, hopelessness and anxiety  Pain   0  AVH   no  S/I   no  H/I   no  Affect   Depressed, sad    Patient seems to have declined as compared to last shifts' assessment.  Patient is increasingly depressed and sad.  She states she did not get much out of her \"interview with the doctor today.\"  She states, \"I feel worse and helpless.  I don't feel like I have a plan or what's going to happen.\"  It seems that patient is having difficulty gathering a code word to complete application for, [employer] in, [place], [place].  She states, \"I have good days but days like today I don't know how to deal.\"  Patient states she has difficulty expressing her needs and concerns to providers related to anxiety and \"shamefullness\" about her mental health issues.    Patient seemed to brighten towards end of conversation as we explored different ways to approach completing the application on, Monday and future goals to pursue.  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    Will continue to monitor patient and intervene as prudent and necessary.      Intervention  Medications reviewed and administered  Assessment complete    Response  Verbalized understanding   Took medications  Interacted with assessment    Plan  Will promote and reinforce current treatment plan and encourage involvement in care plan goals.   Will provide for safe, calm, quiet environment.  Will promote open communication with staff and foster a trusting/working relationship with patient.   Will promote participation in groups and therapies and independent reflection.        "

## 2018-05-07 NOTE — DISCHARGE SUMMARY
"Admission Date: 2018    Discharge Date: 2018    Psychiatric History: Patient is a 23 y.o. female who presents with suicide attempt. Onset of symptoms was abrupt starting a few days ago.  Symptoms have been present on an intermittent basis. Symptoms are associated with anxiety, insomnia and depressed mood.  Symptoms are aggravated by breakup with boyfriend.   Patient's symptoms occur in the context of social chaos.     She overdosed on 5 tablets of doxepin after her boyfriend broke up with her.  She notes that she found out that he was cheating.  She confronted him over the phone when he was at the other girls house and he told her that it was her fault and broke up with her.  After the phone call \"I kind of lost it.\"  She felt very alone.  \"I felt I had nobody.\"  She started taking the doxepin in an effort to overdose.  She stopped after taking 5 pills because she got scared.  She was at home and she walked outside to take the pills b/c the boyfriend's mom was there in the trailer.  She did not want her to take her medications away to prevent her from overdosing.  She was serious about ending her life.     Her boyfriend broke up with him when she found out he was cheating on her.  She notes that was the \"straw that broke the camel's back.\"  Mom passed away 1.5yrs ago (Dec 2016) and dad  when she was 16 and step-mom  when she was 15.     Patient states she has history of bipolar, unsure which type.  This is being managed by Dr Vazquez in [place], she has not been there in 2 months, because she did not have the money to do so.     She was on doxepin, vistaril, vyvanse, trileptal and effexor prior to admission.  She notes these were started in .  She saw a psychiatrist (Dr. Vazquez) in [place] while she lived with grandmother.  She has since moved back here but has been commuting to see him there.     She notes periods of spending money and reckless and making bad decisions \"that I " "wouldn't normally make.\"  \"I'm usually in denial when I'm manic.\"  She realizes afterward.  She notes decreased need for sleep, feels more distractible.  She notes speech is pressured and thoughts are racing.  They have lasted a week or more but usually a few days.     Psychiatric Review Of Systems:  Pertinent items are noted in HPI.     History  Past psychiatric history:     Psychiatric Hospitalizations: Patient has had no prior hospitalizations.     Suicide Attempts: Patient has had no prior suicide attempts.     Prior Treatment and Medications Tried: She was on doxepin, vistaril, vyvanse, trileptal and effexor prior to admission.  She got one shot of abilify 3 months ago.     History of violence or legal issues: The patient has no history of significant violence.       Diagnostic Data:    Recent Results (from the past 168 hour(s))   Comprehensive Metabolic Panel    Collection Time: 04/30/18  6:57 PM   Result Value Ref Range    Glucose 89 60 - 100 mg/dL    BUN 10 7 - 21 mg/dL    Creatinine 0.78 0.50 - 1.00 mg/dL    Sodium 139 137 - 145 mmol/L    Potassium 3.7 3.5 - 5.1 mmol/L    Chloride 104 95 - 110 mmol/L    CO2 21.0 (L) 22.0 - 31.0 mmol/L    Calcium 9.3 8.4 - 10.2 mg/dL    Total Protein 7.6 6.3 - 8.6 g/dL    Albumin 4.40 3.40 - 4.80 g/dL    ALT (SGPT) 41 9 - 52 U/L    AST (SGOT) 28 14 - 36 U/L    Alkaline Phosphatase 119 38 - 126 U/L    Total Bilirubin 0.3 0.2 - 1.3 mg/dL    eGFR Non  Amer 92 71 - 165 mL/min/1.73    Globulin 3.2 2.3 - 3.5 gm/dL    A/G Ratio 1.4 1.1 - 1.8 g/dL    BUN/Creatinine Ratio 12.8 7.0 - 25.0    Anion Gap 14.0 5.0 - 15.0 mmol/L   Acetaminophen Level    Collection Time: 04/30/18  6:57 PM   Result Value Ref Range    Acetaminophen <10.0 (L) 10.0 - 30.0 mcg/mL   Ethanol    Collection Time: 04/30/18  6:57 PM   Result Value Ref Range    Ethanol <10 0 - 10 mg/dL    Ethanol % <0.010 %   Salicylate Level    Collection Time: 04/30/18  6:57 PM   Result Value Ref Range    Salicylate <1.0 (L) " 10.0 - 20.0 mg/dL   Light Blue Top    Collection Time: 04/30/18  6:57 PM   Result Value Ref Range    Extra Tube hold for add-on    Green Top (Gel)    Collection Time: 04/30/18  6:57 PM   Result Value Ref Range    Extra Tube Hold for add-ons.    Lavender Top    Collection Time: 04/30/18  6:57 PM   Result Value Ref Range    Extra Tube hold for add-on    CBC Auto Differential    Collection Time: 04/30/18  6:57 PM   Result Value Ref Range    WBC 8.85 3.20 - 9.80 10*3/mm3    RBC 5.09 3.77 - 5.16 10*6/mm3    Hemoglobin 13.8 12.0 - 15.5 g/dL    Hematocrit 40.9 35.0 - 45.0 %    MCV 80.4 80.0 - 98.0 fL    MCH 27.1 26.5 - 34.0 pg    MCHC 33.7 31.4 - 36.0 g/dL    RDW 13.0 11.5 - 14.5 %    RDW-SD 38.2 36.4 - 46.3 fl    MPV 10.6 8.0 - 12.0 fL    Platelets 257 150 - 450 10*3/mm3    Neutrophil % 78.5 37.0 - 80.0 %    Lymphocyte % 17.1 10.0 - 50.0 %    Monocyte % 4.0 0.0 - 12.0 %    Eosinophil % 0.1 0.0 - 7.0 %    Basophil % 0.1 0.0 - 2.0 %    Immature Grans % 0.2 0.0 - 0.5 %    Neutrophils, Absolute 6.95 2.00 - 8.60 10*3/mm3    Lymphocytes, Absolute 1.51 0.60 - 4.20 10*3/mm3    Monocytes, Absolute 0.35 0.00 - 0.90 10*3/mm3    Eosinophils, Absolute 0.01 0.00 - 0.70 10*3/mm3    Basophils, Absolute 0.01 0.00 - 0.20 10*3/mm3    Immature Grans, Absolute 0.02 0.00 - 0.02 10*3/mm3   TSH+Free T4    Collection Time: 04/30/18  6:57 PM   Result Value Ref Range    TSH 1.280 0.460 - 4.680 mIU/mL    Free T4 1.20 0.78 - 2.19 ng/dL   Urine Drug Screen - Urine, Clean Catch    Collection Time: 04/30/18  6:58 PM   Result Value Ref Range    Amphetamine Screen, Urine Negative Negative    Barbiturates Screen, Urine Negative Negative    Benzodiazepine Screen, Urine Negative Negative    Cocaine Screen, Urine Negative Negative    Methadone Screen, Urine Negative Negative    Opiate Screen Negative Negative    Oxycodone Screen, Urine Negative Negative    THC, Screen, Urine Positive (A) Negative   POC Glucose Once    Collection Time: 04/30/18  7:04 PM    Result Value Ref Range    Glucose 71 70 - 130 mg/dL   Basic Metabolic Panel    Collection Time: 05/01/18  6:12 AM   Result Value Ref Range    Glucose 85 60 - 100 mg/dL    BUN 10 7 - 21 mg/dL    Creatinine 0.73 0.50 - 1.00 mg/dL    Sodium 143 137 - 145 mmol/L    Potassium 3.8 3.5 - 5.1 mmol/L    Chloride 108 95 - 110 mmol/L    CO2 25.0 22.0 - 31.0 mmol/L    Calcium 9.5 8.4 - 10.2 mg/dL    eGFR Non African Amer 100 >60 mL/min/1.73    BUN/Creatinine Ratio 13.7 7.0 - 25.0    Anion Gap 10.0 5.0 - 15.0 mmol/L   CBC Auto Differential    Collection Time: 05/01/18  6:12 AM   Result Value Ref Range    WBC 6.28 3.20 - 9.80 10*3/mm3    RBC 4.82 3.77 - 5.16 10*6/mm3    Hemoglobin 13.0 12.0 - 15.5 g/dL    Hematocrit 39.3 35.0 - 45.0 %    MCV 81.5 80.0 - 98.0 fL    MCH 27.0 26.5 - 34.0 pg    MCHC 33.1 31.4 - 36.0 g/dL    RDW 13.3 11.5 - 14.5 %    RDW-SD 39.7 36.4 - 46.3 fl    MPV 10.8 8.0 - 12.0 fL    Platelets 246 150 - 450 10*3/mm3    Neutrophil % 54.7 37.0 - 80.0 %    Lymphocyte % 36.5 10.0 - 50.0 %    Monocyte % 7.8 0.0 - 12.0 %    Eosinophil % 0.6 0.0 - 7.0 %    Basophil % 0.2 0.0 - 2.0 %    Immature Grans % 0.2 0.0 - 0.5 %    Neutrophils, Absolute 3.44 2.00 - 8.60 10*3/mm3    Lymphocytes, Absolute 2.29 0.60 - 4.20 10*3/mm3    Monocytes, Absolute 0.49 0.00 - 0.90 10*3/mm3    Eosinophils, Absolute 0.04 0.00 - 0.70 10*3/mm3    Basophils, Absolute 0.01 0.00 - 0.20 10*3/mm3    Immature Grans, Absolute 0.01 0.00 - 0.02 10*3/mm3   THC (marijuana), Urine, Confirmation - Urine, Clean Catch    Collection Time: 05/01/18  8:57 AM   Result Value Ref Range    THC, Urine Positive (A)     Carboxy THC, Urine 183 Cutoff=10 ng/mL    Please note Comment    Glucose, Fasting    Collection Time: 05/02/18  5:23 AM   Result Value Ref Range    Glucose, Fasting 75 60 - 110 mg/dL   Lipid Panel    Collection Time: 05/02/18  5:23 AM   Result Value Ref Range    Total Cholesterol 134 0 - 199 mg/dL    Triglycerides 79 20 - 199 mg/dL    HDL Cholesterol 27  (L) 60 - 200 mg/dL    LDL Cholesterol  82 1 - 129 mg/dL    LDL/HDL Ratio 3.38 (H) 0.00 - 3.22     No results found.    Summary of Hospital Course:  Patient was admitted to the behavioral health unit at [facility] to ensure patient safety.  Patient was provided treatment with the unit milieu, activities, therapies and psychopharmacological management.  Patient was placed on Q15 minute checks and Suicide.  Dr. Johnson was consulted for management of medical co-morbidities.  Patient was restarted on the following psychiatric medications: Stopped all home psych meds.  The following medication changes were made during the hospital stay: Started seroquel 100mg qhs and increased gradually to 300mg qhs for bipolar depression.  She had anxiety during the day and found 25mg of seroquel during the day very helpful.  She had improvement in mood and affect and resolution of suicidal thoughts.  Patient had improvement over the course of the hospital stay and tolerated his medications.  Patient had family session with Grandmother and Aunt.  Substance abuse issues were not present.      Patients Condition at Discharge:  Patient is stable for discharge and is not an imminent threat to self or others.  The patient's behavrior was Appropriate.  Patient reported that mood was Euthymic.  Patient's affect was normal.  Patient's thought content was as follows:   Suicidal:  None   Homicidal:  None   Hallucinations:  None   Delusion:  None    Discharge Diagnosis:  Principal Problem:    Bipolar 1 disorder, depressed, severe  Active Problems:    Suicide attempt by drug ingestion      Discharge Medications:      Your medication list      START taking these medications      Instructions Last Dose Given Next Dose Due   cetirizine 10 MG tablet  Commonly known as:  zyrTEC  Start taking on:  5/8/2018      Take 1 tablet by mouth Daily.       QUEtiapine 25 MG tablet  Commonly known as:  SEROquel      Take 1 tablet by mouth Daily As  Needed (severe anxiety).       QUEtiapine 300 MG tablet  Commonly known as:  SEROquel      Take 1 tablet by mouth Every Night.          CONTINUE taking these medications      Instructions Last Dose Given Next Dose Due   l-methylfolate 15 MG tablet tablet      Take 1 tablet by mouth Daily.       NEXPLANON SC      Nexplanon       ranitidine 150 MG capsule  Commonly known as:  ZANTAC      Take 150 mg by mouth Every 12 (Twelve) Hours.          STOP taking these medications    doxepin 10 MG capsule  Commonly known as:  SINEquan        EFFEXOR XR 75 MG 24 hr capsule  Generic drug:  venlafaxine XR        TRILEPTAL 150 MG tablet  Generic drug:  OXcarbazepine        VISTARIL 50 MG capsule  Generic drug:  hydrOXYzine        VYVANSE 30 MG capsule  Generic drug:  lisdexamfetamine              Where to Get Your Medications      These medications were sent to [facility] [place] - [address] AT [facility] - [phone] [facility] [phone]   [address] [facility] [address]    Phone:  [phone]    cetirizine 10 MG tablet   QUEtiapine 25 MG tablet   QUEtiapine 300 MG tablet         Justification for multiple antipsychotic medications at discharge:  Not Applicable.    Medication for smoking cessation: Patient does not smoke and medication is not indicated.    Medication for substance abuse: Patient does not have a substance use diagnosis and medication is not indicated.    Disposition: Patient was discharged home with family.    Follow-up Information     No Known Provider Follow up.    Contact information:  [facility] [place] [address]             [facility] Follow up in 2 week(s).    Why:  Open access clinic from 8:30 am to 4:00 pm  Bring Your ID,SS, insurance card and med bottles to follow up appt   Call Crisis hotline as needed [phone]  Please request Medication appt after your open access appt     Contact information:  [address]  [facility] [address]  [phone]                 Psychiatric follow up will be with [facility].  Medical follow up will be with primary care physician.    Time Spent: More than 30 minutes.  Time spent for discharge and answering questions for family session.    Mishel Barr MD  05/07/18  12:35 PM

## 2018-05-07 NOTE — NURSING NOTE
Pt discharged per Dr's order. Aftercare discharge plan reviewed and copy given. Pt was alert, oriented x3 verbal and ambulatory upon leaving. She had all belongings on discharge.

## 2018-05-07 NOTE — PLAN OF CARE
Problem: Overarching Goals (Adult)  Goal: Optimized Coping Skills in Response to Life Stressors  Outcome: Ongoing (interventions implemented as appropriate)  This seems to be an area where patient is struggling.  She verifies this as well.    Goal: Develops/Participates in Therapeutic [place] to Support Successful Transition  Outcome: Ongoing (interventions implemented as appropriate)   05/05/18 2117   Overarching Goals (Adult)   Develops/Participates in Therapeutic [place] to Support Successful Transition making progress toward outcome     Intervention: Foster Therapeutic [place]   05/06/18 0958   Interventions   Trust Relationship/Rapport care explained;choices provided;emotional support provided;empathic listening provided;questions answered;questions encouraged;reassurance provided;thoughts/feelings acknowledged     Intervention: Mutually Develop Transition Plan   05/06/18 2039   Mutually Develop Transition Plan   Transition Support follow-up care discussed         Problem: Patient Care Overview  Goal: Plan of Care Review  Outcome: Ongoing (interventions implemented as appropriate)   05/06/18 2039   Coping/Psychosocial   Plan of Care Reviewed With patient   Plan of Care Review   Progress (patient seems to have declined since last shift assessment)   Coping/Psychosocial   Patient Agreement with Plan of Care agrees     Goal: Individualization and Mutuality  Outcome: Ongoing (interventions implemented as appropriate)   05/06/18 2039   Personal Strengths/Vulnerabilities   Patient Personal Strengths independent living skills   Patient Vulnerabilities pt is not expressive about needs/concerns to provider   Individualization   Patient Specific Preferences to go to, [facility]   Patient Specific Goals (Include Timeframe) within the week   Mutuality/Individual Preferences   What Anxieties, Fears, Concerns, or Questions Do You Have About Your Care? Pt is afraid of being discharged too soon; afraid  "staff are going \"to kick her out\"     Goal: Discharge Needs Assessment  Outcome: Ongoing (interventions implemented as appropriate)   05/06/18 2039   Discharge Needs Assessment   Readmission Within the Last 30 Days no previous admission in last 30 days   Concerns to be Addressed adjustment to diagnosis/illness;compliance issue;coping/stress;decision making;mental health   Patient/Family Anticipates Transition to other (see comments)  (group home)   Patient/Family Anticipated Services at Transition outpatient care;mental health services         "

## 2018-05-07 NOTE — PLAN OF CARE
Problem: Suicidal Behavior (Adult)  Goal: Suicidal Behavior is Absent/Minimized/Managed  Outcome: Outcome(s) achieved Date Met: 05/07/18      Problem: Mood Impairment (Depressive Signs/Symptoms) (Adult)  Goal: Improved Mood Symptoms (Depressive Signs/Symptoms)  Outcome: Outcome(s) achieved Date Met: 05/07/18      Problem: Decreased Participation/Engagement (Depressive Signs/Symptoms) (Adult)  Goal: Increased Participation/Engagement (Depressive Signs/Symptoms)  Outcome: Outcome(s) achieved Date Met: 05/07/18      Problem: Overarching Goals (Adult)  Goal: Optimized Coping Skills in Response to Life Stressors  Outcome: Outcome(s) achieved Date Met: 05/07/18    Goal: Develops/Participates in Therapeutic [place] to Support Successful Transition  Outcome: Outcome(s) achieved Date Met: 05/07/18      Problem: Patient Care Overview  Goal: Plan of Care Review  Outcome: Outcome(s) achieved Date Met: 05/07/18    Goal: Individualization and Mutuality  Outcome: Outcome(s) achieved Date Met: 05/07/18    Goal: Discharge Needs Assessment  Outcome: Outcome(s) achieved Date Met: 05/07/18    Goal: Interprofessional Rounds/Family Conf  Outcome: Outcome(s) achieved Date Met: 05/07/18

## 2020-07-22 PROBLEM — F41.1 GENERALIZED ANXIETY DISORDER: Status: ACTIVE | Noted: 2020-07-22

## 2020-07-22 PROBLEM — F90.9 ATTENTION DEFICIT HYPERACTIVITY DISORDER: Status: ACTIVE | Noted: 2020-07-22

## 2020-07-22 PROBLEM — G47.00 INSOMNIA: Status: ACTIVE | Noted: 2020-07-22

## 2020-07-22 PROBLEM — F31.9 BIPOLAR I DISORDER (HCC): Status: ACTIVE | Noted: 2020-07-22

## 2020-07-22 PROBLEM — Z91.51 HISTORY OF ATTEMPTED SUICIDE: Status: ACTIVE | Noted: 2018-07-10

## 2021-04-13 ENCOUNTER — OFFICE VISIT (OUTPATIENT)
Dept: FAMILY MEDICINE CLINIC | Facility: CLINIC | Age: 26
End: 2021-04-13

## 2021-04-13 VITALS
OXYGEN SATURATION: 98 % | HEART RATE: 80 BPM | HEIGHT: 61 IN | DIASTOLIC BLOOD PRESSURE: 85 MMHG | SYSTOLIC BLOOD PRESSURE: 130 MMHG | BODY MASS INDEX: 47.58 KG/M2 | WEIGHT: 252 LBS | TEMPERATURE: 97.6 F

## 2021-04-13 DIAGNOSIS — N92.6 MISSED PERIOD: Primary | ICD-10-CM

## 2021-04-13 DIAGNOSIS — R63.5 WEIGHT GAIN: ICD-10-CM

## 2021-04-13 DIAGNOSIS — Z51.81 THERAPEUTIC DRUG MONITORING: ICD-10-CM

## 2021-04-13 DIAGNOSIS — Z30.011 ENCOUNTER FOR BCP (BIRTH CONTROL PILLS) INITIAL PRESCRIPTION: ICD-10-CM

## 2021-04-13 DIAGNOSIS — I10 ESSENTIAL HYPERTENSION: ICD-10-CM

## 2021-04-13 DIAGNOSIS — E53.8 VITAMIN B12 DEFICIENCY: ICD-10-CM

## 2021-04-13 DIAGNOSIS — E55.9 VITAMIN D DEFICIENCY: ICD-10-CM

## 2021-04-13 DIAGNOSIS — Z11.59 ENCOUNTER FOR HEPATITIS C SCREENING TEST FOR LOW RISK PATIENT: ICD-10-CM

## 2021-04-13 LAB
B-HCG UR QL: NEGATIVE
INTERNAL NEGATIVE CONTROL: NEGATIVE
INTERNAL POSITIVE CONTROL: POSITIVE
Lab: NORMAL

## 2021-04-13 PROCEDURE — 99204 OFFICE O/P NEW MOD 45 MIN: CPT | Performed by: NURSE PRACTITIONER

## 2021-04-13 PROCEDURE — 81025 URINE PREGNANCY TEST: CPT | Performed by: NURSE PRACTITIONER

## 2021-04-13 RX ORDER — TRAZODONE HYDROCHLORIDE 100 MG/1
100 TABLET ORAL
COMMUNITY
Start: 2021-04-08

## 2021-04-13 RX ORDER — LITHIUM CARBONATE 300 MG/1
1 CAPSULE ORAL 2 TIMES DAILY
COMMUNITY
Start: 2021-02-19

## 2021-04-13 RX ORDER — DROSPIRENONE AND ETHINYL ESTRADIOL 0.02-3(28)
1 KIT ORAL DAILY
Qty: 28 TABLET | Refills: 12 | Status: SHIPPED | OUTPATIENT
Start: 2021-04-13

## 2021-04-13 RX ORDER — LISINOPRIL 20 MG/1
20 TABLET ORAL DAILY
Qty: 30 TABLET | Refills: 2 | Status: SHIPPED | OUTPATIENT
Start: 2021-04-13

## 2021-04-13 NOTE — PROGRESS NOTES
"Chief Complaint  Establish Care, Blood Pressure Check, and Menstrual Problem    Subjective          Magdalena Marquis presents to [facility]  History of Present Illness  B/P:  Recent reading have all been high:  Best reading 142/93 and highest 161/101.  She has been checking daily.  She does note a headache when readings are very high.  She was treated for hypertension at age 17.  She was taken off medication when readings normalized and she has not taken anything for the past few years.   She started having headaches last August.  She did not start checking her b/p until 3 weeks ago.  AMENORRHEA:  Patient is 4 days late for period and is aware that on Lithium she should not get pregnant.  She is interested in birth control at today's encounter.  She has taken oral contraceptives before.  She had no problem with them.  Objective   Vital Signs:   /85 (BP Location: Left arm, Patient Position: Sitting, Cuff Size: Large Adult)   Pulse 80   Temp 97.6 °F (36.4 °C)   Ht 154.9 cm (61\") Comment: pt reported  Wt 114 kg (252 lb)   SpO2 98%   BMI 47.61 kg/m²     Physical Exam  Vitals and nursing note reviewed.   Constitutional:       General: She is not in acute distress.     Appearance: Normal appearance. She is well-developed. She is obese. She is not ill-appearing or diaphoretic.   HENT:      Head: Normocephalic and atraumatic.   Eyes:      Conjunctiva/sclera: Conjunctivae normal.      Pupils: Pupils are equal, round, and reactive to light.   Cardiovascular:      Rate and Rhythm: Normal rate and regular rhythm.      Heart sounds: Normal heart sounds. No murmur heard.     Pulmonary:      Effort: Pulmonary effort is normal. No respiratory distress.      Breath sounds: Normal breath sounds.   Musculoskeletal:         General: Normal range of motion.      Cervical back: Normal range of motion and neck supple.   Skin:     General: Skin is warm and dry.      Capillary Refill: " Capillary refill takes less than 2 seconds.      Findings: No erythema.   Neurological:      General: No focal deficit present.      Mental Status: She is alert and oriented to person, place, and time.   Psychiatric:         Mood and Affect: Mood normal.         Behavior: Behavior normal.         Thought Content: Thought content normal.         Judgment: Judgment normal.        Result Review :                 Assessment and Plan    Diagnoses and all orders for this visit:    1. Missed period (Primary)  -     POCT pregnancy, urine    2. Encounter for BCP (birth control pills) initial prescription  -     drospirenone-ethinyl estradiol (NIK) 3-0.02 MG per tablet; Take 1 tablet by mouth Daily.  Dispense: 28 tablet; Refill: 12    3. Essential hypertension  -     lisinopril (PRINIVIL,ZESTRIL) 20 MG tablet; Take 1 tablet by mouth Daily.  Dispense: 30 tablet; Refill: 2  -     Comprehensive metabolic panel  -     Lipid Panel With LDL/HDL Ratio  -     CBC w AUTO Differential    4. Vitamin B12 deficiency  -     Vitamin B12    5. Vitamin D deficiency  -     Vitamin D 25 hydroxy    6. Encounter for hepatitis C screening test for low risk patient  -     Hepatitis C antibody    7. Weight gain  -     T4  -     TSH    8. Therapeutic drug monitoring  -     Lithium level        Follow Up   Return in about 2 weeks (around 4/27/2021) for Annual physical; labs today.  Patient was given instructions and counseling regarding her condition or for health maintenance advice. Please see specific information pulled into the AVS if appropriate.

## 2021-04-14 LAB
25(OH)D3+25(OH)D2 SERPL-MCNC: 25.9 NG/ML (ref 30–100)
ALBUMIN SERPL-MCNC: 4.1 G/DL (ref 3.9–5)
ALBUMIN/GLOB SERPL: 1.6 {RATIO} (ref 1.2–2.2)
ALP SERPL-CCNC: 99 IU/L (ref 39–117)
ALT SERPL-CCNC: 22 IU/L (ref 0–32)
AST SERPL-CCNC: 22 IU/L (ref 0–40)
BASOPHILS # BLD AUTO: 0 X10E3/UL (ref 0–0.2)
BASOPHILS NFR BLD AUTO: 1 %
BILIRUB SERPL-MCNC: 0.2 MG/DL (ref 0–1.2)
BUN SERPL-MCNC: 10 MG/DL (ref 6–20)
BUN/CREAT SERPL: 12 (ref 9–23)
CALCIUM SERPL-MCNC: 9.3 MG/DL (ref 8.7–10.2)
CHLORIDE SERPL-SCNC: 107 MMOL/L (ref 96–106)
CHOLEST SERPL-MCNC: 137 MG/DL (ref 100–199)
CO2 SERPL-SCNC: 19 MMOL/L (ref 20–29)
CREAT SERPL-MCNC: 0.82 MG/DL (ref 0.57–1)
EOSINOPHIL # BLD AUTO: 0.1 X10E3/UL (ref 0–0.4)
EOSINOPHIL NFR BLD AUTO: 1 %
ERYTHROCYTE [DISTWIDTH] IN BLOOD BY AUTOMATED COUNT: 14 % (ref 11.7–15.4)
GLOBULIN SER CALC-MCNC: 2.5 G/DL (ref 1.5–4.5)
GLUCOSE SERPL-MCNC: 85 MG/DL (ref 65–99)
HCT VFR BLD AUTO: 38.3 % (ref 34–46.6)
HCV AB S/CO SERPL IA: <0.1 S/CO RATIO (ref 0–0.9)
HDLC SERPL-MCNC: 39 MG/DL
HGB BLD-MCNC: 12.7 G/DL (ref 11.1–15.9)
IMM GRANULOCYTES # BLD AUTO: 0 X10E3/UL (ref 0–0.1)
IMM GRANULOCYTES NFR BLD AUTO: 1 %
LDLC SERPL CALC-MCNC: 82 MG/DL (ref 0–99)
LDLC/HDLC SERPL: 2.1 RATIO (ref 0–3.2)
LITHIUM SERPL-SCNC: 0.2 MMOL/L (ref 0.6–1.2)
LYMPHOCYTES # BLD AUTO: 2 X10E3/UL (ref 0.7–3.1)
LYMPHOCYTES NFR BLD AUTO: 30 %
MCH RBC QN AUTO: 26.9 PG (ref 26.6–33)
MCHC RBC AUTO-ENTMCNC: 33.2 G/DL (ref 31.5–35.7)
MCV RBC AUTO: 81 FL (ref 79–97)
MONOCYTES # BLD AUTO: 0.3 X10E3/UL (ref 0.1–0.9)
MONOCYTES NFR BLD AUTO: 5 %
NEUTROPHILS # BLD AUTO: 4.2 X10E3/UL (ref 1.4–7)
NEUTROPHILS NFR BLD AUTO: 62 %
PLATELET # BLD AUTO: 311 X10E3/UL (ref 150–450)
POTASSIUM SERPL-SCNC: 4.5 MMOL/L (ref 3.5–5.2)
PROT SERPL-MCNC: 6.6 G/DL (ref 6–8.5)
RBC # BLD AUTO: 4.72 X10E6/UL (ref 3.77–5.28)
SODIUM SERPL-SCNC: 139 MMOL/L (ref 134–144)
T4 SERPL-MCNC: 7.7 UG/DL (ref 4.5–12)
TRIGL SERPL-MCNC: 79 MG/DL (ref 0–149)
TSH SERPL DL<=0.005 MIU/L-ACNC: 1.56 UIU/ML (ref 0.45–4.5)
VIT B12 SERPL-MCNC: 477 PG/ML (ref 232–1245)
VLDLC SERPL CALC-MCNC: 16 MG/DL (ref 5–40)
WBC # BLD AUTO: 6.7 X10E3/UL (ref 3.4–10.8)

## 2021-04-16 ENCOUNTER — TELEPHONE (OUTPATIENT)
Dept: FAMILY MEDICINE CLINIC | Facility: CLINIC | Age: 26
End: 2021-04-16

## 2021-04-16 NOTE — TELEPHONE ENCOUNTER
Caller: Magdalena Marquis    Relationship: Self    Best call back number: [phone]      What test was performed: Labs    When was the test performed: 4/13/21    Additional notes:  Please call back w/ test  Results.

## 2021-04-19 ENCOUNTER — TELEPHONE (OUTPATIENT)
Dept: FAMILY MEDICINE CLINIC | Facility: CLINIC | Age: 26
End: 2021-04-19

## 2021-04-19 NOTE — TELEPHONE ENCOUNTER
Caller: Magdalena Marquis    Relationship: Self    Best call back number: [phone]    What medications are you currently taking:   Current Outpatient Medications on File Prior to Visit   Medication Sig Dispense Refill   • drospirenone-ethinyl estradiol (NIK) 3-0.02 MG per tablet Take 1 tablet by mouth Daily. 28 tablet 12   • lisinopril (PRINIVIL,ZESTRIL) 20 MG tablet Take 1 tablet by mouth Daily. 30 tablet 2   • lithium carbonate 300 MG capsule Take 1 capsule by mouth 2 (two) times a day.     • traZODone (DESYREL) 100 MG tablet Take 100 mg by mouth every night at bedtime.       No current facility-administered medications on file prior to visit.        Which medication are you concerned about:     lisinopril (PRINIVIL,ZESTRIL) 20 MG tablet    drospirenone-ethinyl estradiol (NIK) 3-0.02 MG per tablet    What are your concerns:STOMACH PAIN

## 2021-04-19 NOTE — TELEPHONE ENCOUNTER
Lisinopril would be unlikely to cause any GI issues; however, the BCP can produce GI symptoms the first month or so.  If patient does not feel like she can continue, we can change to another next month.  It is very likely that all the symptoms will go away after the first month.

## 2021-05-04 ENCOUNTER — OFFICE VISIT (OUTPATIENT)
Dept: FAMILY MEDICINE CLINIC | Facility: CLINIC | Age: 26
End: 2021-05-04

## 2021-05-04 VITALS
HEIGHT: 61 IN | SYSTOLIC BLOOD PRESSURE: 122 MMHG | BODY MASS INDEX: 46.9 KG/M2 | TEMPERATURE: 98 F | DIASTOLIC BLOOD PRESSURE: 78 MMHG | WEIGHT: 248.4 LBS | OXYGEN SATURATION: 98 % | HEART RATE: 96 BPM

## 2021-05-04 DIAGNOSIS — I10 ESSENTIAL HYPERTENSION: ICD-10-CM

## 2021-05-04 DIAGNOSIS — Z00.00 ANNUAL PHYSICAL EXAM: Primary | ICD-10-CM

## 2021-05-04 DIAGNOSIS — E66.01 MORBID OBESITY WITH BMI OF 45.0-49.9, ADULT (HCC): ICD-10-CM

## 2021-05-04 DIAGNOSIS — E55.9 VITAMIN D DEFICIENCY: ICD-10-CM

## 2021-05-04 PROCEDURE — 99395 PREV VISIT EST AGE 18-39: CPT | Performed by: NURSE PRACTITIONER

## 2021-05-04 RX ORDER — PHENTERMINE HYDROCHLORIDE 37.5 MG/1
37.5 TABLET ORAL
Qty: 30 TABLET | Refills: 0 | Status: SHIPPED | OUTPATIENT
Start: 2021-05-04

## 2021-05-04 RX ORDER — PRAZOSIN HYDROCHLORIDE 1 MG/1
1 CAPSULE ORAL
COMMUNITY
Start: 2021-04-30 | End: 2022-05-01

## 2021-05-04 NOTE — PROGRESS NOTES
"Chief Complaint  Annual Exam    Subjective          Magdalena Marquis presents to [facility]  History of Present Illness  OBESITY:  Patient is interested in weight loss assistance.  She has tried limiting calories, high protein, low fat, increasing water, increasing exercise with poor results.  She states she put on the excess weight when mental health had her on Seroquel and since stopping the med, she has been unable to lose the weight.  Her goal is to be below 200#.    Objective   Vital Signs:   /78 (BP Location: Right arm, Patient Position: Sitting, Cuff Size: Large Adult)   Pulse 96   Temp 98 °F (36.7 °C)   Ht 154.9 cm (61\") Comment: pt reported  Wt 113 kg (248 lb 6.4 oz)   SpO2 98%   BMI 46.93 kg/m²       Physical Exam  Vitals and nursing note reviewed.   Constitutional:       General: She is not in acute distress.     Appearance: Normal appearance. She is well-developed. She is obese. She is not ill-appearing or diaphoretic.   HENT:      Head: Normocephalic and atraumatic.   Eyes:      Conjunctiva/sclera: Conjunctivae normal.      Pupils: Pupils are equal, round, and reactive to light.   Cardiovascular:      Rate and Rhythm: Normal rate and regular rhythm.      Heart sounds: Normal heart sounds. No murmur heard.     Pulmonary:      Effort: Pulmonary effort is normal. No respiratory distress.      Breath sounds: Normal breath sounds.   Abdominal:      General: Bowel sounds are normal. There is no distension.      Palpations: Abdomen is soft.      Tenderness: There is no abdominal tenderness.   Genitourinary:     Comments: Per GYN.  Musculoskeletal:         General: Normal range of motion.      Cervical back: Normal range of motion and neck supple.   Skin:     General: Skin is warm and dry.      Capillary Refill: Capillary refill takes less than 2 seconds.      Findings: No erythema.   Neurological:      General: No focal deficit present.      Mental Status: She " is alert and oriented to person, place, and time. Mental status is at baseline.   Psychiatric:         Mood and Affect: Mood normal.         Behavior: Behavior normal.         Thought Content: Thought content normal.         Judgment: Judgment normal.        Result Review :{Labs  Result Review  Imaging  Med Tab  Media :23}                 Assessment and Plan    Diagnoses and all orders for this visit:    1. Annual physical exam (Primary)    2. Morbid obesity with BMI of 45.0-49.9, adult (CMS/[facility])  -     phentermine (Adipex-P) 37.5 MG tablet; Take 1 tablet by mouth Every Morning Before Breakfast.  Dispense: 30 tablet; Refill: 0    3. Essential hypertension    4. Vitamin D deficiency    Patient encouraged to continue healthy diet rich in fresh fruits and vegetables as well as lean proteins with calorie count <2,000/day for weight control.  Patient encouraged to participate in 30 min sustained exercise at least 5 days weekly.  Patient encouraged to continue social distancing despite getting her Covid vaccine.    We addressed use of medication for weight loss.  Will trial phentermine. Patient is aware that should this be beneficial, we will ask her to come in for a weight check after the first month.  She will be one month on, one month off.  She is aware that long term she would be required to sign a contract and provide a UDS.  [facility] check today was negative for any controlled substance in the past year.  Patient is agreeable.    Follow Up   Return in about 3 months (around 8/4/2021) for Next scheduled follow up.  Patient was given instructions and counseling regarding her condition or for health maintenance advice. Please see specific information pulled into the AVS if appropriate.